# Patient Record
Sex: FEMALE | Race: WHITE | Employment: FULL TIME | URBAN - METROPOLITAN AREA
[De-identification: names, ages, dates, MRNs, and addresses within clinical notes are randomized per-mention and may not be internally consistent; named-entity substitution may affect disease eponyms.]

---

## 2017-04-17 PROBLEM — E11.9 DIABETES MELLITUS TYPE 2, CONTROLLED (HCC): Status: ACTIVE | Noted: 2017-04-17

## 2017-05-26 ENCOUNTER — HOSPITAL ENCOUNTER (OUTPATIENT)
Dept: MAMMOGRAPHY | Age: 57
Discharge: HOME | End: 2017-05-26
Attending: FAMILY MEDICINE
Payer: COMMERCIAL

## 2017-05-26 DIAGNOSIS — Z12.39 SCREENING FOR BREAST CANCER: ICD-10-CM

## 2017-05-26 PROCEDURE — 77067 SCR MAMMO BI INCL CAD: CPT

## 2018-10-03 PROBLEM — E66.01 SEVERE OBESITY (HCC): Status: ACTIVE | Noted: 2018-10-03

## 2018-12-04 ENCOUNTER — HOSPITAL ENCOUNTER (OUTPATIENT)
Dept: MAMMOGRAPHY | Age: 58
Discharge: HOME | End: 2018-12-04
Attending: FAMILY MEDICINE
Payer: COMMERCIAL

## 2018-12-04 DIAGNOSIS — Z12.39 SCREENING BREAST EXAMINATION: ICD-10-CM

## 2018-12-04 PROCEDURE — 77067 SCR MAMMO BI INCL CAD: CPT

## 2019-10-06 ENCOUNTER — HOSPITAL ENCOUNTER (OUTPATIENT)
Dept: SLEEP MEDICINE | Age: 59
Discharge: HOME | End: 2019-10-06
Payer: COMMERCIAL

## 2019-10-06 PROCEDURE — 95811 POLYSOM 6/>YRS CPAP 4/> PARM: CPT

## 2019-10-24 PROBLEM — G47.31 COMPLEX SLEEP APNEA SYNDROME: Status: ACTIVE | Noted: 2019-10-24

## 2019-10-24 PROBLEM — G47.33 OSA (OBSTRUCTIVE SLEEP APNEA): Status: ACTIVE | Noted: 2019-10-24

## 2019-10-24 PROBLEM — R23.2 FLUSHING: Status: ACTIVE | Noted: 2019-10-24

## 2020-02-14 ENCOUNTER — HOSPITAL ENCOUNTER (OUTPATIENT)
Dept: MAMMOGRAPHY | Age: 60
Discharge: HOME | End: 2020-02-14
Attending: FAMILY MEDICINE
Payer: COMMERCIAL

## 2020-02-14 DIAGNOSIS — Z12.39 SCREENING FOR BREAST CANCER: ICD-10-CM

## 2020-02-14 PROCEDURE — 77067 SCR MAMMO BI INCL CAD: CPT

## 2021-03-15 PROBLEM — D12.2 ADENOMATOUS POLYP OF ASCENDING COLON: Status: ACTIVE | Noted: 2021-03-15

## 2021-06-01 ENCOUNTER — HOSPITAL ENCOUNTER (OUTPATIENT)
Dept: MAMMOGRAPHY | Age: 61
Discharge: HOME | End: 2021-06-01
Attending: FAMILY MEDICINE
Payer: COMMERCIAL

## 2021-06-01 ENCOUNTER — TRANSCRIBE ORDER (OUTPATIENT)
Dept: SCHEDULING | Age: 61
End: 2021-06-01

## 2021-06-01 DIAGNOSIS — Z12.31 SCREENING MAMMOGRAM FOR HIGH-RISK PATIENT: Primary | ICD-10-CM

## 2021-06-01 DIAGNOSIS — Z12.31 SCREENING MAMMOGRAM FOR HIGH-RISK PATIENT: ICD-10-CM

## 2021-06-01 PROCEDURE — 77067 SCR MAMMO BI INCL CAD: CPT

## 2022-03-18 PROBLEM — G47.33 OSA (OBSTRUCTIVE SLEEP APNEA): Status: ACTIVE | Noted: 2019-10-24

## 2022-03-19 PROBLEM — R23.2 FLUSHING: Status: ACTIVE | Noted: 2019-10-24

## 2022-03-19 PROBLEM — E66.01 SEVERE OBESITY (HCC): Status: ACTIVE | Noted: 2018-10-03

## 2022-03-19 PROBLEM — E11.9 DIABETES MELLITUS TYPE 2, CONTROLLED (HCC): Status: ACTIVE | Noted: 2017-04-17

## 2022-03-20 PROBLEM — D12.2 ADENOMATOUS POLYP OF ASCENDING COLON: Status: ACTIVE | Noted: 2021-03-15

## 2022-07-18 NOTE — TELEPHONE ENCOUNTER
Patient called and left a message stating that she will run out of some of her medication before her coming appointment.

## 2022-07-19 RX ORDER — EMPAGLIFLOZIN, METFORMIN HYDROCHLORIDE 12.5; 1 MG/1; MG/1
2 TABLET, EXTENDED RELEASE ORAL DAILY
Qty: 180 TABLET | Refills: 1 | Status: SHIPPED | OUTPATIENT
Start: 2022-07-19

## 2022-07-19 RX ORDER — NEBIVOLOL 5 MG/1
5 TABLET ORAL NIGHTLY
Qty: 90 TABLET | Refills: 1 | Status: SHIPPED | OUTPATIENT
Start: 2022-07-19

## 2022-07-19 RX ORDER — SIMVASTATIN 20 MG
20 TABLET ORAL NIGHTLY
Qty: 90 TABLET | Refills: 1 | Status: SHIPPED | OUTPATIENT
Start: 2022-07-19

## 2022-08-04 DIAGNOSIS — I10 ESSENTIAL HYPERTENSION, BENIGN: Primary | ICD-10-CM

## 2022-08-04 DIAGNOSIS — E78.2 MIXED HYPERLIPIDEMIA: ICD-10-CM

## 2022-08-04 DIAGNOSIS — E11.9 CONTROLLED TYPE 2 DIABETES MELLITUS WITHOUT COMPLICATION, WITHOUT LONG-TERM CURRENT USE OF INSULIN (HCC): ICD-10-CM

## 2022-08-09 DIAGNOSIS — I10 ESSENTIAL HYPERTENSION, BENIGN: ICD-10-CM

## 2022-08-09 DIAGNOSIS — E11.9 CONTROLLED TYPE 2 DIABETES MELLITUS WITHOUT COMPLICATION, WITHOUT LONG-TERM CURRENT USE OF INSULIN (HCC): ICD-10-CM

## 2022-08-09 DIAGNOSIS — E78.2 MIXED HYPERLIPIDEMIA: ICD-10-CM

## 2022-08-09 LAB
ALBUMIN SERPL-MCNC: 4.3 G/DL (ref 3.2–4.6)
ALBUMIN/GLOB SERPL: 1.5 {RATIO} (ref 1.2–3.5)
ALP SERPL-CCNC: 86 U/L (ref 50–136)
ALT SERPL-CCNC: 30 U/L (ref 12–65)
ANION GAP SERPL CALC-SCNC: 4 MMOL/L (ref 7–16)
AST SERPL-CCNC: 15 U/L (ref 15–37)
BILIRUB SERPL-MCNC: 0.6 MG/DL (ref 0.2–1.1)
BUN SERPL-MCNC: 22 MG/DL (ref 8–23)
CALCIUM SERPL-MCNC: 9.6 MG/DL (ref 8.3–10.4)
CHLORIDE SERPL-SCNC: 107 MMOL/L (ref 98–107)
CHOLEST SERPL-MCNC: 137 MG/DL
CO2 SERPL-SCNC: 29 MMOL/L (ref 21–32)
CREAT SERPL-MCNC: 1 MG/DL (ref 0.6–1)
GLOBULIN SER CALC-MCNC: 2.9 G/DL (ref 2.3–3.5)
GLUCOSE SERPL-MCNC: 128 MG/DL (ref 65–100)
HDLC SERPL-MCNC: 38 MG/DL (ref 40–60)
HDLC SERPL: 3.6 {RATIO}
LDLC SERPL CALC-MCNC: 68.2 MG/DL
POTASSIUM SERPL-SCNC: 4.6 MMOL/L (ref 3.5–5.1)
PROT SERPL-MCNC: 7.2 G/DL (ref 6.3–8.2)
SODIUM SERPL-SCNC: 140 MMOL/L (ref 136–145)
TRIGL SERPL-MCNC: 154 MG/DL (ref 35–150)
VLDLC SERPL CALC-MCNC: 30.8 MG/DL (ref 6–23)

## 2022-08-10 LAB
EST. AVERAGE GLUCOSE BLD GHB EST-MCNC: 134 MG/DL
HBA1C MFR BLD: 6.3 % (ref 4.8–5.6)

## 2022-08-16 ENCOUNTER — OFFICE VISIT (OUTPATIENT)
Dept: FAMILY MEDICINE CLINIC | Facility: CLINIC | Age: 62
End: 2022-08-16
Payer: COMMERCIAL

## 2022-08-16 VITALS
BODY MASS INDEX: 32.76 KG/M2 | DIASTOLIC BLOOD PRESSURE: 67 MMHG | HEART RATE: 62 BPM | TEMPERATURE: 96.6 F | RESPIRATION RATE: 18 BRPM | SYSTOLIC BLOOD PRESSURE: 120 MMHG | WEIGHT: 178 LBS | HEIGHT: 62 IN

## 2022-08-16 DIAGNOSIS — L81.4 SOLAR LENTIGO: ICD-10-CM

## 2022-08-16 DIAGNOSIS — E11.9 CONTROLLED TYPE 2 DIABETES MELLITUS WITHOUT COMPLICATION, WITHOUT LONG-TERM CURRENT USE OF INSULIN (HCC): ICD-10-CM

## 2022-08-16 DIAGNOSIS — M25.552 BILATERAL HIP PAIN: ICD-10-CM

## 2022-08-16 DIAGNOSIS — D12.2 ADENOMATOUS POLYP OF ASCENDING COLON: ICD-10-CM

## 2022-08-16 DIAGNOSIS — M76.32 IT BAND SYNDROME, LEFT: Primary | ICD-10-CM

## 2022-08-16 DIAGNOSIS — M25.551 BILATERAL HIP PAIN: ICD-10-CM

## 2022-08-16 PROCEDURE — 3044F HG A1C LEVEL LT 7.0%: CPT | Performed by: FAMILY MEDICINE

## 2022-08-16 PROCEDURE — 99214 OFFICE O/P EST MOD 30 MIN: CPT | Performed by: FAMILY MEDICINE

## 2022-08-16 RX ORDER — MAGNESIUM 30 MG
30 TABLET ORAL 2 TIMES DAILY
COMMUNITY

## 2022-08-16 ASSESSMENT — ENCOUNTER SYMPTOMS
DIARRHEA: 0
CONSTIPATION: 0
SHORTNESS OF BREATH: 0
VOMITING: 0
COUGH: 0

## 2022-08-16 NOTE — PROGRESS NOTES
Nicky Richard (:  1960) is a 62 y.o. female,Established patient, here for evaluation of the following chief complaint(s):  3 Month Follow-Up (On HTN and blood work.), Hip Pain (On both side but more when she has been walking or sitting a lot. The pain has been in the back as well. Did have a shooting pain in her left leg at night about 10 days ago.), Skin Problem (On her chest.), and Other (Is over due for her Colonoscopy and is schedule for one later this year. Had never had a Shingels shot, and had not had a Pneumonia shot in the last 10 years. Has been over a year since last Diabetic foot and Eye exam, but does have eye exam schedule later this year.)         ASSESSMENT/PLAN:  1. It band syndrome, left  2. Bilateral hip pain  -     XR HIP BILATERAL W AP PELVIS (2 VIEWS); Future  3. Adenomatous polyp of ascending colon  -     [facility] - Jean Perkins MD, Gastroenterology, [place]  4. Controlled type 2 diabetes mellitus without complication, without long-term current use of insulin (HCC)  -     SITagliptin (JANUVIA) 100 MG tablet; Take 1 tablet by mouth in the morning., Disp-90 tablet, R-1Normal  5. Solar lentigo  Labs done prior to appointment were reviewed in person today. Refilled [facility]. Hip xrays ordered. If no bony abnormality and persistent lateral hip pain, may come back for steroid injections and or we can do PT. She does have some relief with stretching, so she can try this as well as new shoes. Overdue for colo, but is supposed to get one soon. I don't see that order. I went ahead and put in a referral for this as well. Chest lesion suspect solar lentigo w/ some potential AK. Asked her to take a picture next to coin to keep an eye on this and return if any concerns and we will biopsy. Return in about 6 months (around 2023). Subjective   SUBJECTIVE/OBJECTIVE:  HPI  Chief Complaint   Patient presents with    3 Month Follow-Up     On HTN and blood work.     Hip Pain     On both side but more when she has been walking or sitting a lot. The pain has been in the back as well. Did have a shooting pain in her left leg at night about 10 days ago. Skin Problem     On her chest.    Other     Is over due for her Colonoscopy and is schedule for one later this year. Had never had a Shingels shot, and had not had a Pneumonia shot in the last 10 years. Has been over a year since last Diabetic foot and Eye exam, but does have eye exam schedule later this year. F/U HTN - BP well controlled. Compliant w/ med(s). Denies s/s target organ damage. Bystolic  F/U DM - blood sugars well controlled per pt. Compliant w/ meds w/o adverse effects. Denies s/s uncontrolled DM or of target organ damage. Lab Results   Component Value Date    LABA1C 6.3 (H) 08/09/2022     Lab Results   Component Value Date     08/09/2022     Taking synjardy and [facility] and [place] w/ good results. F/U  Dyslipidemia -  Pt states compliant w/ regimen w/o medication adverse effects. Zocor 20mg  F/U allergic rhinitis - well controlled on current regimen. Denies nasal/ocular sx's. Zyrtec and flonase    Hasn't been walking as much due to her hips hurting on both sides. She was walking every day and it was irritating them but now she has decreased to every other day. Does have some pain at rest.      Back seems to be sore in the middle and to the right of the lumbar spine. Now also having pain in the left IT band area. Review of Systems   Constitutional:  Negative for diaphoresis and unexpected weight change. HENT:  Negative for congestion. Eyes:  Negative for visual disturbance. Respiratory:  Negative for cough and shortness of breath. Cardiovascular:  Negative for chest pain. Gastrointestinal:  Negative for constipation, diarrhea and vomiting. Genitourinary:  Negative for dysuria. Neurological:  Negative for headaches. Psychiatric/Behavioral:  Negative for dysphoric mood and sleep disturbance. The patient is not nervous/anxious. Objective   Physical Exam  Vitals reviewed. Constitutional:       Appearance: Normal appearance. She is obese. HENT:      Head: Normocephalic. Eyes:      Extraocular Movements: Extraocular movements intact. Conjunctiva/sclera: Conjunctivae normal.      Pupils: Pupils are equal, round, and reactive to light. Cardiovascular:      Rate and Rhythm: Normal rate and regular rhythm. Heart sounds: Normal heart sounds. No murmur heard. Pulmonary:      Effort: Pulmonary effort is normal. No respiratory distress. Breath sounds: Normal breath sounds. Abdominal:      General: Bowel sounds are normal.      Palpations: Abdomen is soft. Musculoskeletal:         General: Normal range of motion. Right hip: Tenderness present. No crepitus. Normal range of motion. Normal strength. Left hip: Tenderness present. No crepitus. Normal range of motion. Normal strength. Legs:       Comments: Areas tenderness. Skin:     General: Skin is warm and dry. Neurological:      General: No focal deficit present. Mental Status: She is alert. Psychiatric:         Mood and Affect: Mood normal.         Behavior: Behavior normal.   /67 (Site: Left Upper Arm, Position: Sitting, Cuff Size: Large Adult)   Pulse 62   Temp (!) 96.6 °F (35.9 °C)   Resp 18   Ht 5' 2\" (1.575 m)   Wt 178 lb (80.7 kg)   BMI 32.56 kg/m²       An electronic signature was used to authenticate this note.     --Larey Ahumada, MD

## 2022-09-08 NOTE — PROGRESS NOTES
[address] [place] [address] Court, [address] [facility]  [phone]    Patient Name:  Percy Baker  YOB: 1960      Office Visit 9/9/2022    CHIEF COMPLAINT:    Chief Complaint   Patient presents with    Sleep Apnea    Follow-up         HISTORY OF PRESENT ILLNESS:      Patient is a 63 yo female seen today for follow up of obstructive sleep apnea. Patient was diagnosed with moderate sleep apnea in 2019 with AHI 21.8 events per hour. Lowest oxygen was 78%. She was started on APAP 5 to 15 cm and remains on this pressure  today. She is prescribed cpap therapy with a humidifier set at 5-15 cm with a full face mask. Most recent download reveals AHI on PAP therapy is 2.6, leak is 33.7 and the hourly usage is 7 hours 39 minutes nightly. The overall use is 2784 hours with days greater than four hours at 361/365. The patient is compliant with the Pap therapy and is feeling better as a result. She reports she is sleeping well with CPAP therapy and denies any daytime fatigue or needing to nap during the day. [place] score is 0/24. Her leaks are slightly high at 33.7. She states she does notice an increase in leaks when she is on her back but she mainly sleeps on her side so this is not an issue. She reports her weight has been around 180 pounds over the last year with no significant change. She denies any swelling in her ankles or feet. Her blood pressure is controlled today.               Past Medical History:   Diagnosis Date    Allergic conjunctivitis     Allergic rhinitis     Diabetes mellitus type 2, controlled ([address] 4/17/2017    Fibroadenosis of breast     Hypercholesterolemia     Hypertension     Multiple thyroid nodules     Sinusitis     Type 2 diabetes mellitus ([address]          Patient Active Problem List   Diagnosis    TERESA (obstructive sleep apnea)    Fibroadenosis of breast    Mixed hyperlipidemia    Allergic rhinitis    Severe obesity ([address]    Essential hypertension, benign    Diabetes mellitus type 2, controlled ([facility]    Multiple thyroid nodules    Flushing    Adenomatous polyp of ascending colon    Allergic conjunctivitis          Past Surgical History:   Procedure Laterality Date    BREAST BIOPSY  03/18/2011    calcifications removed from right breast    COLONOSCOPY  12/15/2015    Diverticulosis and polyps of colon and internal and external hemorrhoids    HYSTERECTOMY ([facility])  2001    ovaries intact           Social History     Socioeconomic History    Marital status:      Spouse name: Not on file    Number of children: Not on file    Years of education: Not on file    Highest education level: Not on file   Occupational History    Not on file   Tobacco Use    Smoking status: Never    Smokeless tobacco: Never   Substance and Sexual Activity    Alcohol use: No    Drug use: No    Sexual activity: Not on file   Other Topics Concern    Not on file   Social History Narrative    Not on file     Social Determinants of Health     Financial Resource Strain: Not on file   Food Insecurity: Not on file   Transportation Needs: Not on file   Physical Activity: Not on file   Stress: Not on file   Social Connections: Not on file   Intimate Partner Violence: Not on file   Housing Stability: Not on file         Family History   Problem Relation Age of Onset    Rheum Arthritis Mother     Hypertension Mother     Osteoarthritis Mother     Thyroid Disease Mother     Heart Disease Father         CAD    Stroke Father     Hypertension Father     Osteoarthritis Father     Heart Attack Father     Cancer Brother         lymphoma    Osteoarthritis Sister     Hypertension Sister     Breast Cancer Maternal Aunt 76    Diabetes Neg Hx     Thyroid Cancer Neg Hx          No Known Allergies      Current Outpatient Medications   Medication Sig    magnesium 30 MG tablet Take 30 mg by mouth in the morning and 30 mg before bedtime.     SITagliptin (JANUVIA) 100 MG tablet Take 1 tablet by mouth in the morning. nebivolol (BYSTOLIC) 5 MG tablet Take 1 tablet by mouth at bedtime TAKE 1 TABLET BY MOUTH NIGHTLY    simvastatin (ZOCOR) 20 MG tablet Take 1 tablet by mouth nightly    Empagliflozin-metFORMIN HCl ER (SYNJARDY XR) 12.5-1000 MG TB24 Take 2 tablets by mouth daily    albuterol sulfate HFA (PROVENTIL;VENTOLIN;PROAIR) 108 (90 Base) MCG/ACT inhaler Inhale 2 puffs into the lungs every 4 hours as needed    cetirizine (ZYRTEC) 10 MG tablet Take by mouth    fluticasone (FLONASE) 50 MCG/ACT nasal spray 2 sprays in each nare qd    olopatadine (PATADAY) 0.2 % SOLN ophthalmic solution Apply 1 drop to eye daily     No current facility-administered medications for this visit. REVIEW OF SYSTEMS:   CONSTITUTIONAL:   There is no history of fever, chills, night sweats, weight loss, weight gain, persistent fatigue, or lethargy/hypersomnolence. CARDIAC:   No chest pain, pressure, discomfort, palpitations, orthopnea, murmurs, or edema. GI:   No dysphagia, heartburn reflux, nausea/vomiting, diarrhea, abdominal pain, or bleeding. NEURO:   There is no history of AMS, persistent headache, decreased level of consciousness, seizures, or motor or sensory deficits. PHYSICAL EXAM:    Vitals:    09/09/22 0957   BP: 114/68   Pulse: 68   Resp: 15   Temp: 97 °F (36.1 °C)   SpO2: 98%   Weight: 180 lb 9.6 oz (81.9 kg)   Height: 5' 2\" (1.575 m)        BMI: 32.56        GENERAL APPEARANCE:   The patient is normal weight and in no respiratory distress. HEENT:   PERRL. Conjunctivae unremarkable. Nasal mucosa is without epistaxis, exudate, or polyps. Nares patent bilateral.  Gums and dentition are unremarkable. There is oropharyngeal narrowing. Jordan score 2. NECK/LYMPHATIC:   Symmetrical with no elevation of jugular venous pulsation. Trachea midline. No thyroid enlargement. No cervical adenopathy. LUNGS:   Normal respiratory effort with symmetrical lung expansion.    Breath sounds clear.   HEART:   There is a regular rate and rhythm. No murmur, rub, or gallop. There is no edema in the lower extremities. ABDOMEN:   Soft and non-tender. No hepatosplenomegaly. Bowel sounds are normal.     NEURO:   The patient is alert and oriented to person, place, and time. Memory appears intact and mood is normal.  No gross sensorimotor deficits are present. ASSESSMENT:  (Medical Decision Making)      Diagnosis Orders   1. TERESA (obstructive sleep apnea)  DME - DURABLE MEDICAL EQUIPMENT - Patient is using, compliant and benefiting from Pap therapy. Continue current settings as AHI is down to 2.6 events per hour. 2. Obesity (BMI 30.0-34. 9)  Patient can lose weight including ambulating 8-10,000 steps. Can Build Up Slowly as tolerated to this goal.    Also modifying dietary intake with decrease carbohydrates and sweets. Told to use avoid the P's --> Pizza, Pasta, Pastry, etc.  Increase fruits, vegetables, and lean meats e.g. [place] [place]), chicken or game meat. Patient is aware the goal is to consume less than 2000 dylan/day, since patient is a nondiabetic. We discussed using the Sarthak LOSE IT to count calories. Patient can police themselves. If the future, if still having issues can use a more regimented diet e.g. [place], [address] etc. Clinical correlation suggested.             PLAN:    Continue CPAP 5-15 cm H2O with nightly compliance  New supplies ordered  Recommendations as above  Follow-up in 1 year or sooner if needed    Orders Placed This Encounter   Procedures    DME - [address] [facility]  Phone: [phone] [address]  Dept: [phone]      Patient Name: Ilsa Zavaleta  : 1960  Gender: female  Address: [place] [address]  Patient phone: [phone] (home) [phone] (work)      Primary Insurance: Payor: [facility] [place] [facility] / Plan: [facility] [employer] / Product Type: *No Product type* /   Subscriber ID: [id number] - (Commercial)      AMB Supply Order  Order Details     DME Location: [facility]   Order Date: 9/9/2022   The primary encounter diagnosis was TERESA (obstructive sleep apnea). A diagnosis of Obesity (BMI 30.0-34.9) was also pertinent to this visit. (  X   )Supplies Needed       CPAP Machine   (     ) CPAP Unit  (  x   ) Auto CPAP Unit  (     ) BiLevel Unit  (     ) Auto BiLevel Unit  (     ) ASV   (     ) Bilevel ST      Length of need: 12 months    Pressure:  5-15 cmH20  EPR: 3     Starting Ramp Pressure:   cm H20  Ramp Time: min      Patient had a diagnostic Apnea Hypopnea Index (AHI) of :  21.8  *SUPPLIES* Replace all as needed, or per coverage guidelines     Masks Type:  ( x   ) -Full Face Mask (1 per 3 mon)  (  x  ) -Full Mask (1 per month) Interface/Cushion      (  ) -Nasal Mask (1 per 3 mon)  (  ) - Nasal Mask (1 per month) Interface/Cushion  (     ) -Pillow (2 per mon)  (     ) -Nfyvwdsvz (1 per 6 mon)            Other Supplies:    (   X  )-Pnwrqval (1 per 6 mon)  (   X  )-Fkssou Tubing (1 per 3 mon)  (   X  )- Disposable Filter (2 per mon)  (   x  )-Gfpzbl Humidifier (1 per year)     ( x    )-Unjavcyzg (sometimes used with Full Face Mask) (1 per 6 mos)  (    )-Tubing-without heat (1 per 3 mos)  (     )-Non-Disposable Filter (1 per 6 mos)  (  x   )-Water Chamber (1 per 6 mos)  (     )-Humidifier non-heated (1 per 5 yrs)      Signed Date: 9/9/2022  Electronically Signed By: [id number], APRN - CNP  Electronically Dated:  9/9/2022               Collaborating Physician: Dr. Mounika Sy    Over 50% of today's office visit was spent in face to face time reviewing test results, prognosis, importance of compliance, education about disease process, benefits of medications, instructions for management of acute flare-ups, and follow up plans.   Total face to face time spent with patient was 20 minutes.         [address], APRN - CNP  Electronically signed

## 2022-09-09 ENCOUNTER — OFFICE VISIT (OUTPATIENT)
Dept: SLEEP MEDICINE | Age: 62
End: 2022-09-09
Payer: COMMERCIAL

## 2022-09-09 VITALS
BODY MASS INDEX: 33.23 KG/M2 | HEIGHT: 62 IN | SYSTOLIC BLOOD PRESSURE: 114 MMHG | WEIGHT: 180.6 LBS | OXYGEN SATURATION: 98 % | TEMPERATURE: 97 F | RESPIRATION RATE: 15 BRPM | HEART RATE: 68 BPM | DIASTOLIC BLOOD PRESSURE: 68 MMHG

## 2022-09-09 DIAGNOSIS — E66.9 OBESITY (BMI 30.0-34.9): ICD-10-CM

## 2022-09-09 DIAGNOSIS — G47.33 OSA (OBSTRUCTIVE SLEEP APNEA): Primary | ICD-10-CM

## 2022-09-09 PROCEDURE — 99213 OFFICE O/P EST LOW 20 MIN: CPT | Performed by: NURSE PRACTITIONER

## 2022-09-09 ASSESSMENT — SLEEP AND FATIGUE QUESTIONNAIRES
HOW LIKELY ARE YOU TO NOD OFF OR FALL ASLEEP WHILE LYING DOWN TO REST IN THE AFTERNOON WHEN CIRCUMSTANCES PERMIT: 0
HOW LIKELY ARE YOU TO NOD OFF OR FALL ASLEEP WHILE WATCHING TV: 0
ESS TOTAL SCORE: 0
HOW LIKELY ARE YOU TO NOD OFF OR FALL ASLEEP WHILE SITTING AND READING: 0
HOW LIKELY ARE YOU TO NOD OFF OR FALL ASLEEP WHILE SITTING QUIETLY AFTER LUNCH WITHOUT ALCOHOL: 0
HOW LIKELY ARE YOU TO NOD OFF OR FALL ASLEEP IN A CAR, WHILE STOPPED FOR A FEW MINUTES IN TRAFFIC: 0
HOW LIKELY ARE YOU TO NOD OFF OR FALL ASLEEP WHILE SITTING INACTIVE IN A PUBLIC PLACE: 0
HOW LIKELY ARE YOU TO NOD OFF OR FALL ASLEEP WHEN YOU ARE A PASSENGER IN A CAR FOR AN HOUR WITHOUT A BREAK: 0
HOW LIKELY ARE YOU TO NOD OFF OR FALL ASLEEP WHILE SITTING AND TALKING TO SOMEONE: 0

## 2022-09-09 NOTE — PATIENT INSTRUCTIONS
Continue CPAP 5-15 cm H2O with nightly compliance  New supplies ordered  Recommendations as above  Follow-up in 1 year or sooner if needed

## 2022-09-23 ENCOUNTER — HOSPITAL ENCOUNTER (OUTPATIENT)
Dept: GENERAL RADIOLOGY | Age: 62
Discharge: HOME | End: 2022-09-26
Payer: COMMERCIAL

## 2022-09-23 DIAGNOSIS — M25.552 BILATERAL HIP PAIN: ICD-10-CM

## 2022-09-23 DIAGNOSIS — M25.551 BILATERAL HIP PAIN: ICD-10-CM

## 2022-09-23 PROCEDURE — 73521 X-RAY EXAM HIPS BI 2 VIEWS: CPT

## 2022-09-26 ENCOUNTER — TELEPHONE (OUTPATIENT)
Dept: FAMILY MEDICINE CLINIC | Facility: CLINIC | Age: 62
End: 2022-09-26

## 2022-09-26 NOTE — TELEPHONE ENCOUNTER
I called the patient about this, but got her voice mail box. I left a message letting her know what Dr. Lexii Wang stated about the results. I asked her to please call back to let us know what she wants to do.

## 2022-09-26 NOTE — TELEPHONE ENCOUNTER
----- Message from Dasha Dick MD sent at 9/26/2022  7:40 AM EDT -----  [place] showed no significant osteoarthritis. I'm wondering if she would like to pursue pt or even injections in office.

## 2022-10-12 ENCOUNTER — HOSPITAL ENCOUNTER (OUTPATIENT)
Dept: PHYSICAL THERAPY | Age: 62
Setting detail: RECURRING SERIES
Discharge: HOME | End: 2022-10-15
Payer: COMMERCIAL

## 2022-10-12 PROCEDURE — 97161 PT EVAL LOW COMPLEX 20 MIN: CPT

## 2022-10-12 PROCEDURE — 97140 MANUAL THERAPY 1/> REGIONS: CPT

## 2022-10-12 PROCEDURE — 97110 THERAPEUTIC EXERCISES: CPT

## 2022-10-12 ASSESSMENT — PAIN SCALES - GENERAL: PAINLEVEL_OUTOF10: 2

## 2022-10-12 NOTE — THERAPY EVALUATION
Martín Flores  : 1960  Primary: [employer]  Secondary:  [address] @ [facility]  [address]  [facility] [address] [facility] [address]  Phone: [phone]  Fax: [phone]         PT Visit Info:         Visit Count:  1    OUTPATIENT PHYSICAL THERAPY:OP NOTE TYPE: Initial Assessment 10/12/2022               Episode  Appt Desk         Treatment Diagnosis:  Pain in left hip (M25.552)  Stiffness of Left Hip, Not elsewhere classified (M25.652)  Pain in Right Hip (M25.551)  Stiffness of Right Hip, Not elsewhere classified (M25.651)  Medical/Referring Diagnosis:  It band syndrome, left [M76.32]  Bilateral hip pain [M25.551, M25.552]  Referring Physician:  Nika Sharma MD MD Orders:  PT Eval and Treat   Return MD Appt:    Date of Onset:  Onset Date: 22   Allergies:  Patient has no known allergies. Restrictions/Precautions:           Medications Last Reviewed:  10/12/2022     SUBJECTIVE   History of Injury/Illness (Reason for Referral):  Pt states she has been having bilateral hip pain (R>L) since 2022. Pain is primarily located in the posterior-lateral aspect of the hip and is a constant dull/aching pain that doesn't vary much. Will occasionally get a sharp pain in the low back. Groin pain with over activity. Pt also reports some radiating L LE pain. This pain goes down the lateral aspect of the leg to the calf and foot and happens with over activity and only comes on at night (1-2x in last few weeks). Eases: moving, NSAIDs  Aggs: sitting  Patient Stated Goal(s):   \"Decrease hip pain, improve function\"  Initial:     2/10 Post Session:     1/10  Past Medical History/Comorbidities:   Ms. Paula Emerson  has a past medical history of Allergic conjunctivitis, Allergic rhinitis, Diabetes mellitus type 2, controlled ([facility] Fibroadenosis of breast, Hypercholesterolemia, Hypertension, Multiple thyroid nodules, Sinusitis, and Type 2 diabetes mellitus [facility]).  Ms. Priya Winston  has a past surgical history that includes Breast biopsy (03/18/2011); Hysterectomy (2001); and Colonoscopy (12/15/2015). Social History/Living Environment:   Lives With: Spouse  Type of Home: House  Home Layout: Two level     Prior Level of Function/Work/Activity:   Prior level of function: Walking 3mi daily  Prior level of function: Independent           Learning:   Does the patient/guardian have any barriers to learning?: No barriers  Will there be a co-learner?: No  What is the preferred language of the patient/guardian?: English  Is an  required?: No  How does the patient/guardian prefer to learn new concepts?: Listening; Reading; Demonstration; Pictures/Videos     Fall Risk Scale: Ortega Total Score: 0  Ortega Fall Risk: Low (0-24)     Personal Factors:        Sex:  female        Age:  62 y.o. OBJECTIVE   Observation  Posture: normal  Gait: normal      ROM   Right Left   Flexion 100 100   Extension 10 10   ER 40 40   IR 5 5     Strength (all MMT scores are graded on a scale of 0-5)   Right Left   Hip      Flexion 5 5   Abduction 4 4   Extension 4 4   Glute Max 4 4   ER 4+ 4+   IR 4+ 4+   Knee     Extension 5 5   Flexion 5 5       Joint/Soft Tissue Mobility   Description   Joint Mobility Hip: hypomobile, painful with A-P  Lumbar: normal, painful with CPA at L5   Soft Tissue Mobility TTP in lateral hips/glute med       Special Tests   Right Left   FADDIR + +   FE + +   Scour's + +   Log Roll - -     Functional Mobility Screen  30sec Sit to Stand:  TUG:  DL Squat:  SL Squat  R:  L:      ASSESSMENT   Initial Assessment:  Will Coker presents to physical therapy with MD diagnosis of a bilateral hip pain. Pt demonstrated signs and symptoms consistent with this diagnosis. On objective examination, the patient demonstrated deficits in ROM, strength, joint mobility, soft tissue mobility, functional ability, functional mobility, and ambulatory ability.  The patient also had increased pain. The patient is limited in the following activities: walking, standing, sitting, stairs, work, ADLs, functional activities, exercise. The patient has a good  prognosis for recovery based on the examination findings and may be limited by: N/A. Patient requires skilled physical therapy services in order to return to prior level of function. Problem List: (Impacting functional limitations): Therapy Prognosis:         Assessment Complexity:      PLAN   Effective Dates: 10/12/22 TO     Frequency/Duration:     Interventions Planned (Treatment may consist of any combination of the following):          Goals: (Goals have been discussed and agreed upon with patient.)  Short-Term Functional Goals: Time Frame: 4 weeks  Pt will be compliant with progressive HEP  Pt will be able to go up stairs at work with < 3/10 pain  Discharge Goals: Time Frame: 10 weeks  Pt will improve HOOS Jr score by 5pts  Pt will be able to walk 2mi with pain < 3/10         Outcome Measure: Tool Used: Hip dysfunction and Osteoarthritis Outcome Score for Joint Replacement (HOOS, JR)  Score:  Initial: 10/24 Most Recent: XX/24   Interpretation of Score: The HOOS, JR contains 6 items from the original HOOS survey. Items are coded from 0 to 4, none to extreme respectively. Cassie Rigoberto is scored by summing the raw response (range 0-24) and then converting it to an interval score using the table provided below. The interval score ranges from 0 to 100 where 0 represents total hip disability and 100 represents perfect hip health. Medical Necessity:   > Patient is expected to demonstrate progress in strength, range of motion, coordination, and functional technique to increase independence with ADLs and functional tasks. Reason For Services/Other Comments:  > Patient continues to require skilled intervention due to return to prior level of function.   Total Duration:       Regarding Barak Crook's therapy, I certify that the treatment plan above will be carried out by a therapist or under their direction. Thank you for this referral,  Carmelo Rankin, PT     Referring Physician Signature: Power Lopez* No Signature is Required for this note.         Post Session Pain  Charge Capture  PT Visit Info MD Guidelines  MyChart

## 2022-10-12 NOTE — PROGRESS NOTES
Erroll Level  : 1960  Primary: [employer]  Secondary:  [address] @ [facility]  [address] [facility] [place] [address]  Phone: [phone]  Fax: [phone] Plan Frequency: 2x/week for 90 days    Plan of Care/Certification Expiration Date: 01/10/23      PT Visit Info:  No data recorded   Visit Count:  1   OUTPATIENT PHYSICAL THERAPY:OP NOTE TYPE: Treatment Note 10/12/2022       Episode  }Appt Desk             Treatment Diagnosis:  Pain in left hip (M25.552)  Stiffness of Left Hip, Not elsewhere classified (M25.652)  Pain in Right Hip (M25.551)  Stiffness of Right Hip, Not elsewhere classified (M25.651)  Medical/Referring Diagnosis:  It band syndrome, left [M76.32]  Bilateral hip pain [M25.551, M25.552]  Referring Physician:  Sixto Fisher MD MD Orders:  PT Eval and Treat   Date of Onset:  Onset Date: 22     Allergies:   Patient has no known allergies. Restrictions/Precautions:  No data recorded  No data recorded   Interventions Planned (Treatment may consist of any combination of the following):    Current Treatment Recommendations: Strengthening; ROM; Functional mobility training; Transfer training; ADL/Self-care training; Stair training; Neuromuscular re-education; Manual Therapy - Soft Tissue Mobilization; Manual Therapy - Joint Manipulation; Pain management; Home exercise program; Safety education & training; Patient/Caregiver education & training; Modalities; Integrated dry needling; Therapeutic activities     Subjective Comments:  See Evaluation Note form today for details  Initial:}    2/10Post Session:       1/10  Medications Last Reviewed:  10/12/2022  Updated Objective Findings:  See evaluation note from today  Treatment   THERAPEUTIC EXERCISE: (15 minutes):    Exercises per grid below to improve mobility, strength, and coordination.   Required minimal visual, verbal, manual, and tactile cues to promote proper body posture and promote proper body mechanics. Progressed resistance, range, repetitions, and complexity of movement as indicated. Date:  10/12 Date:   Date:     Activity/Exercise Parameters Parameters Parameters   Education Plan of car, prognosis, anatomy, HEP     NuStep      Bridges 3x10     Lumbar Rotation X20 B     Clams 3x10 B green TB                     MANUAL THERAPY: (15 minutes):   Joint mobilization, Soft tissue mobilization, and Manipulation was utilized and necessary because of the patient's restricted joint motion, painful spasm, loss of articular motion, and restricted motion of soft tissue. Date:  10/12 Date:   Date:     Activity/Exercise Parameters Parameters Parameters   Soft Tissue      Joint Mobilization B Hips: LAD, SAD     Dry Needling            Treatment/Session Summary:    Treatment Assessment:  See Evaluation Note from today for details  Communication/Consultation:  None today  Equipment provided today:  None  Recommendations/Intent for next treatment session: Next visit will focus on progression as tolerated.     Total Treatment Billable Duration:  45 minutes  Time In: 0800  Time Out: [address], PT       Charge Capture  }Post Session Pain  PT Visit Info  [address] [facility] Portal  MD Guidelines  Scanned Media  Benefits  MyChart    Future Appointments   Date Time Provider Pradeep Wilder   10/19/2022  8:00 AM Martin Xavier, PT [facility]   10/25/2022  8:45 AM Martin Xavier, PT [facility]   10/27/2022  8:00 AM Martin Xavier, PT [facility]   2/9/2023  9:00 AM PRE LAB PRE [facility]VL AMB   2/16/2023  8:40 AM Sue Sim MD PRE GVL AMB   9/11/2023 11:20 AM ROMAN Chakraborty - BENNIE PSCD GVL AMB

## 2022-10-19 ENCOUNTER — HOSPITAL ENCOUNTER (OUTPATIENT)
Dept: PHYSICAL THERAPY | Age: 62
Setting detail: RECURRING SERIES
Discharge: HOME | End: 2022-10-22
Payer: COMMERCIAL

## 2022-10-19 PROCEDURE — 97140 MANUAL THERAPY 1/> REGIONS: CPT

## 2022-10-19 PROCEDURE — 97110 THERAPEUTIC EXERCISES: CPT

## 2022-10-19 ASSESSMENT — PAIN SCALES - GENERAL: PAINLEVEL_OUTOF10: 2

## 2022-10-19 NOTE — PROGRESS NOTES
Georges Tobias  : 1960  Primary: [employer]  Secondary:  [address] @ [facility]  [address] [facility] [address] [place] [address]  Phone: [phone]  Fax: [phone] Plan Frequency: 2x/week for 90 days    Plan of Care/Certification Expiration Date: 01/10/23      PT Visit Info:  No data recorded   Visit Count:  2   OUTPATIENT PHYSICAL THERAPY:OP NOTE TYPE: Treatment Note 10/19/2022       Episode  }Appt Desk             Treatment Diagnosis:  Pain in left hip (M25.552)  Stiffness of Left Hip, Not elsewhere classified (M25.652)  Pain in Right Hip (M25.551)  Stiffness of Right Hip, Not elsewhere classified (M25.651)  Medical/Referring Diagnosis:  It band syndrome, left [M76.32]  Bilateral hip pain [M25.551, M25.552]  Referring Physician:  Romana Johnson MD MD Orders:  PT Eval and Treat   Date of Onset:  Onset Date: 22     Allergies:   Patient has no known allergies. Restrictions/Precautions:  No data recorded  No data recorded   Interventions Planned (Treatment may consist of any combination of the following):    Current Treatment Recommendations: Strengthening; ROM; Functional mobility training; Transfer training; ADL/Self-care training; Stair training; Neuromuscular re-education; Manual Therapy - Soft Tissue Mobilization; Manual Therapy - Joint Manipulation; Pain management; Home exercise program; Safety education & training; Patient/Caregiver education & training; Modalities; Integrated dry needling; Therapeutic activities     Subjective Comments:  Pt states she is feeling ok, a little sore after last session but doing ok  Initial:}    2/10Post Session:       2/10  Medications Last Reviewed:  10/19/2022  Updated Objective Findings:  See evaluation note from today  Treatment   THERAPEUTIC EXERCISE: (30 minutes):    Exercises per grid below to improve mobility, strength, and coordination.   Required minimal visual, verbal, manual, and tactile cues to promote proper body posture and promote proper body mechanics. Progressed resistance, range, repetitions, and complexity of movement as indicated. Date:  10/12 Date:  10/19 Date:     Activity/Exercise Parameters Parameters Parameters   Education Plan of car, prognosis, anatomy, HEP     NuStep  x8min    Bridges 3x10 3x10    Lumbar Rotation X20 B     Clams 3x10 B green TB 3x10 B green TB    Sidestepping  3x1min green TB    Hip Flexion  3x10 B 25lbs                                MANUAL THERAPY: (15 minutes):   Joint mobilization, Soft tissue mobilization, and Manipulation was utilized and necessary because of the patient's restricted joint motion, painful spasm, loss of articular motion, and restricted motion of soft tissue. Date:  10/12 Date:  10/19 Date:     Activity/Exercise Parameters Parameters Parameters   Soft Tissue  R glute med    Joint Mobilization B Hips: LAD, SAD L Hip: A-P grades II-IV    Dry Needling            Treatment/Session Summary:    Treatment Assessment:  Pt tolerated session well, no pain with exercise but some fatigue  Communication/Consultation:  None today  Equipment provided today:  None  Recommendations/Intent for next treatment session: Next visit will focus on progression as tolerated.     Total Treatment Billable Duration:  45 minutes  Time In: 0800  Time Out: [address], PT       Charge Capture  }Post Session Pain  PT Visit Info  [facility] Portal  MD Guidelines  Scanned Media  Benefits  MyChart    Future Appointments   Date Time Provider Pradeep Wilder   10/25/2022  8:45 AM Carmelo Rankin PT [facility]   10/27/2022  8:00 AM GILLIAN HarrisOST SFO   11/1/2022  4:00 PM GILLIAN HarrisOST SFO   11/3/2022  4:00 PM Carmelo Rankin PT [facility]   2/9/2023  9:00 AM PRE LAB PRE GVL AMB   2/16/2023  8:40 AM Emerita Ortiz MD PRE GVL AMB   9/11/2023 11:20 AM ROMAN Howard - BENNIE PSCD GVL AMB

## 2022-10-25 ENCOUNTER — HOSPITAL ENCOUNTER (OUTPATIENT)
Dept: PHYSICAL THERAPY | Age: 62
Setting detail: RECURRING SERIES
Discharge: HOME | End: 2022-10-28
Payer: COMMERCIAL

## 2022-10-25 PROCEDURE — 97110 THERAPEUTIC EXERCISES: CPT

## 2022-10-25 PROCEDURE — 97140 MANUAL THERAPY 1/> REGIONS: CPT

## 2022-10-25 ASSESSMENT — PAIN SCALES - GENERAL: PAINLEVEL_OUTOF10: 1

## 2022-10-25 NOTE — PROGRESS NOTES
Chevy Wan  : 1960  Primary: [employer]  Secondary:  [address] @ [facility]  [address] [facility] [place] [address]  Phone: [phone]  Fax: [phone] Plan Frequency: 2x/week for 90 days    Plan of Care/Certification Expiration Date: 01/10/23      PT Visit Info:  No data recorded   Visit Count:  3   OUTPATIENT PHYSICAL THERAPY:OP NOTE TYPE: Treatment Note 10/25/2022       Episode  }Appt Desk             Treatment Diagnosis:  Pain in left hip (M25.552)  Stiffness of Left Hip, Not elsewhere classified (M25.652)  Pain in Right Hip (M25.551)  Stiffness of Right Hip, Not elsewhere classified (M25.651)  Medical/Referring Diagnosis:  It band syndrome, left [M76.32]  Bilateral hip pain [M25.551, M25.552]  Referring Physician:  Zina Robbins MD MD Orders:  PT Eval and Treat   Date of Onset:  Onset Date: 22     Allergies:   Patient has no known allergies. Restrictions/Precautions:  No data recorded  No data recorded   Interventions Planned (Treatment may consist of any combination of the following):    Current Treatment Recommendations: Strengthening; ROM; Functional mobility training; Transfer training; ADL/Self-care training; Stair training; Neuromuscular re-education; Manual Therapy - Soft Tissue Mobilization; Manual Therapy - Joint Manipulation; Pain management; Home exercise program; Safety education & training; Patient/Caregiver education & training; Modalities; Integrated dry needling; Therapeutic activities     Subjective Comments:  Pt states she is feeling better today  Initial:}    1/10Post Session:       1/10  Medications Last Reviewed:  10/25/2022  Updated Objective Findings:  See evaluation note from today  Treatment   THERAPEUTIC EXERCISE: (30 minutes):    Exercises per grid below to improve mobility, strength, and coordination.   Required minimal visual, verbal, manual, and tactile cues to promote proper body posture and promote proper body mechanics. Progressed resistance, range, repetitions, and complexity of movement as indicated. Date:  10/12 Date:  10/19 Date:  10/25   Activity/Exercise Parameters Parameters Parameters   Education Plan of car, prognosis, anatomy, HEP     NuStep  x8min x8min   Bridges 3x10 3x10 3x10   Lumbar Rotation X20 B     Clams 3x10 B green TB 3x10 B green TB 3x10 B blue TB   Sidestepping  3x1min green TB 3x1min green TB   Hip Flexion  3x10 B 25lbs 3x10 B 25lbs                               MANUAL THERAPY: (15 minutes):   Joint mobilization, Soft tissue mobilization, and Manipulation was utilized and necessary because of the patient's restricted joint motion, painful spasm, loss of articular motion, and restricted motion of soft tissue. Date:  10/12 Date:  10/19 Date:  10/25   Activity/Exercise Parameters Parameters Parameters   Soft Tissue  R glute med R glute med, piriformis   Joint Mobilization B Hips: LAD, SAD L Hip: A-P grades II-IV    Dry Needling            Treatment/Session Summary:    Treatment Assessment:  Pt tolerated session well, decreased hip soreness following MT  Communication/Consultation:  None today  Equipment provided today:  None  Recommendations/Intent for next treatment session: Next visit will focus on progression as tolerated.     Total Treatment Billable Duration:  45 minutes  Time In: 0084  Time Out: [address] [facility], PT       Charge Capture  }Post Session Pain  PT [facility] Info  [facility] Portal  MD Guidelines  Scanned Media  Benefits  MyChart    Future Appointments   Date Time Provider Pradeep Wilder   10/27/2022  8:00 AM Kadeem Crafts, PT [facility]   11/1/2022  4:00 PM Kadeem Crafts, PT [facility]   11/3/2022  4:00 PM Kadeem Crafts, PT [facility]   12/20/2022 11:45 AM SFE MOBILE MAMMO SFERMM SFE   2/9/2023  9:00 AM PRE LAB PRE GVL AMB   2/16/2023  8:40 AM Maureen Herr MD PRE GVL AMB   9/11/2023 11:20 AM Roxane Healy APRN - CNP PSCD GVL AMB

## 2022-10-27 ENCOUNTER — HOSPITAL ENCOUNTER (OUTPATIENT)
Dept: PHYSICAL THERAPY | Age: 62
Setting detail: RECURRING SERIES
Discharge: HOME | End: 2022-10-30
Payer: COMMERCIAL

## 2022-10-27 PROCEDURE — 97110 THERAPEUTIC EXERCISES: CPT

## 2022-10-27 ASSESSMENT — PAIN SCALES - GENERAL: PAINLEVEL_OUTOF10: 1

## 2022-10-27 NOTE — PROGRESS NOTES
Martín Flores  : 1960  Primary: [employer]  Secondary:  [address] @ [facility]  [address] [facility] Martín Coe [place] [address]  Phone: [phone]  Fax: [phone] Plan Frequency: 2x/week for 90 days    Plan of Care/Certification Expiration Date: 01/10/23      PT Visit Info:  No data recorded   Visit Count:  4   OUTPATIENT PHYSICAL THERAPY:OP NOTE TYPE: Treatment Note 10/27/2022       Episode  }Appt Desk             Treatment Diagnosis:  Pain in left hip (M25.552)  Stiffness of Left Hip, Not elsewhere classified (M25.652)  Pain in Right Hip (M25.551)  Stiffness of Right Hip, Not elsewhere classified (M25.651)  Medical/Referring Diagnosis:  It band syndrome, left [M76.32]  Bilateral hip pain [M25.551, M25.552]  Referring Physician:  Nika Sharma MD MD Orders:  PT Eval and Treat   Date of Onset:  Onset Date: 22     Allergies:   Patient has no known allergies. Restrictions/Precautions:  No data recorded  No data recorded   Interventions Planned (Treatment may consist of any combination of the following):    Current Treatment Recommendations: Strengthening; ROM; Functional mobility training; Transfer training; ADL/Self-care training; Stair training; Neuromuscular re-education; Manual Therapy - Soft Tissue Mobilization; Manual Therapy - Joint Manipulation; Pain management; Home exercise program; Safety education & training; Patient/Caregiver education & training; Modalities; Integrated dry needling; Therapeutic activities     Subjective Comments:  Pt states she is feeling better and has no complaints of hip pain. Initial:}    1/10Post Session:       010  Medications Last Reviewed:  10/27/2022  Updated Objective Findings:  See evaluation note from today  Treatment   THERAPEUTIC EXERCISE: (45 minutes):    Exercises per grid below to improve mobility, strength, and coordination.   Required minimal visual, verbal, manual, and tactile cues to promote proper body posture and promote proper body mechanics. Progressed resistance, range, repetitions, and complexity of movement as indicated. Date:  10/19 Date:  10/25 Date:  10/27   Activity/Exercise Parameters Parameters    Education      NuStep x8min x8min x8min   Bridges 3x10 3x10 3x10 w/ blue TB holding iso ABD   Lumbar Rotation      Clams 3x10 B green TB 3x10 B blue TB    Sidestepping 3x1min green TB 3x1min green TB    Hip Flexion 3x10 B 25lbs 3x10 B 25lbs    Supine Marches   3x10   Hurdles at table   [id number]   Balance   x8min   Sit to Stand    2x15 10lbs       MANUAL THERAPY: (0 minutes):   Joint mobilization, Soft tissue mobilization, and Manipulation was utilized and necessary because of the patient's restricted joint motion, painful spasm, loss of articular motion, and restricted motion of soft tissue. Date:  10/12 Date:  10/19 Date:  10/25   Activity/Exercise Parameters Parameters Parameters   Soft Tissue  R glute med R glute med, piriformis   Joint Mobilization B Hips: LAD, SAD L Hip: A-P grades II-IV    Dry Needling            Treatment/Session Summary:    Treatment Assessment:     Communication/Consultation:  None today  Equipment provided today:  None  Recommendations/Intent for next treatment session: Next visit will focus on progression as tolerated.     Total Treatment Billable Duration:  45 minutes  Time In: 0800  Time Out: 0845    Zion Murray       Charge Capture  }Post Session Pain  PT Visit Info  [address] [facility] Portal  MD Guidelines  Scanned Media  Benefits  MyChart    Future Appointments   Date Time Provider [place]   11/1/2022  4:00 PM Alcon Sensor, PT [facility]   11/3/2022  4:00 PM Alcon Sensor, PT [facility]   11/8/2022  8:45 AM Alcon Sensor, PT [facility]   11/10/2022  8:00 AM Alcon Sensor, PT [facility]   12/20/2022 11:45 AM SFE MOBILE MAMMO SFERMM SFE   2/9/2023  9:00 AM PRE LAB PRE GVL AMB   2/16/2023  8:40 AM Gennaro Márquez MD PRE GVL AMB 9/11/2023 11:20 AM Aneesh Healy, ROMAN - BENNIE PSCD GVL AMB

## 2022-11-01 ENCOUNTER — HOSPITAL ENCOUNTER (OUTPATIENT)
Dept: PHYSICAL THERAPY | Age: 62
Setting detail: RECURRING SERIES
Discharge: HOME | End: 2022-11-04
Payer: COMMERCIAL

## 2022-11-01 PROCEDURE — 97110 THERAPEUTIC EXERCISES: CPT

## 2022-11-01 ASSESSMENT — PAIN SCALES - GENERAL: PAINLEVEL_OUTOF10: 1

## 2022-11-01 NOTE — PROGRESS NOTES
Bradley Gaona  : 1960  Primary: [employer]  Secondary:  [address] @ [facility]  [address] [facility] [place] [address]  Phone: [phone]  Fax: [phone] Plan Frequency: 2x/week for 90 days    Plan of Care/Certification Expiration Date: 01/10/23      PT Visit Info:  No data recorded   Visit Count:  5   OUTPATIENT PHYSICAL THERAPY:OP NOTE TYPE: Treatment Note 2022       Episode  }Appt Desk             Treatment Diagnosis:  Pain in left hip (M25.552)  Stiffness of Left Hip, Not elsewhere classified (M25.652)  Pain in Right Hip (M25.551)  Stiffness of Right Hip, Not elsewhere classified (M25.651)  Medical/Referring Diagnosis:  It band syndrome, left [M76.32]  Bilateral hip pain [M25.551, M25.552]  Referring Physician:  Jennifer Mercado MD MD Orders:  PT Eval and Treat   Date of Onset:  Onset Date: 22     Allergies:   Patient has no known allergies. Restrictions/Precautions:  No data recorded  No data recorded   Interventions Planned (Treatment may consist of any combination of the following):    Current Treatment Recommendations: Strengthening; ROM; Functional mobility training; Transfer training; ADL/Self-care training; Stair training; Neuromuscular re-education; Manual Therapy - Soft Tissue Mobilization; Manual Therapy - Joint Manipulation; Pain management; Home exercise program; Safety education & training; Patient/Caregiver education & training; Modalities; Integrated dry needling; Therapeutic activities     Subjective Comments:  Pt states she is doing well and has no pain. Initial:}    10/10Post Session:       00/10  Medications Last Reviewed:  2022  Updated Objective Findings:  See evaluation note from today  Treatment   THERAPEUTIC EXERCISE: (45 minutes):    Exercises per grid below to improve mobility, strength, and coordination.   Required minimal visual, verbal, manual, and tactile cues to promote proper body posture and promote proper body mechanics. Progressed resistance, range, repetitions, and complexity of movement as indicated. Date:  10/25 Date:  10/27 Date  11/1   Activity/Exercise Parameters     Education      NuStep x8min x8min x5min   Bridges 3x10 3x10 w/ blue TB holding iso ABD 2x12 Blue TB  holding iso ABD   Lumbar Rotation      Clams 3x10 B blue TB     Sidestepping 3x1min green TB  3x35ft blue TB   Hip Flexion 3x10 B 25lbs     Supine Marches  3x10    Hurdles at table  [id number]    Balance  x8min x3min   Sit to Stand   2x15 10lbs 3x10 10lbs    Step ups   3x10 B8\" box   Hip Abd   2x10 B 17.5lbs   Monster Walks   3x35ft blue TB   Tandem Walking   3x35ft       MANUAL THERAPY: (0 minutes):   Joint mobilization, Soft tissue mobilization, and Manipulation was utilized and necessary because of the patient's restricted joint motion, painful spasm, loss of articular motion, and restricted motion of soft tissue. Date:  10/12 Date:  10/19 Date:  10/25   Activity/Exercise Parameters Parameters Parameters   Soft Tissue  R glute med R glute med, piriformis   Joint Mobilization B Hips: LAD, SAD L Hip: A-P grades II-IV    Dry Needling            Treatment/Session Summary:    Treatment Assessment:  Pt tolerated tx well and is continuing to increase strength and show less pain. Communication/Consultation:  None today  Equipment provided today:  None  Recommendations/Intent for next treatment session: Next visit will focus on progression as tolerated.     Total Treatment Billable Duration:  45 minutes       [employer] Capture  }Post Session Pain  PT Visit [id number] [facility] Portal  MD Guidelines  Scanned Media  Benefits  MyChart    Future Appointments   Date Time Provider Pradeep Wilder   11/3/2022  4:00 PM Tin Vázquez PT [facility] S[facility]   11/8/2022  8:45 AM Tin Vázquez PT [facility]   11/10/2022  8:00 AM GILLIAN Garcia SFO   12/20/2022 11:45 AM SFE MOBILE MAMMO SFERMM SFE   2/9/2023  9:00 AM PRE LAB PRE GVL AMB   2/16/2023  8:40 AM Arleen Iqbal MD PRE GVL AMB   9/11/2023 11:20 AM ROMAN Sung - BENNIE PSCD GVL AMB

## 2022-11-03 ENCOUNTER — HOSPITAL ENCOUNTER (OUTPATIENT)
Dept: PHYSICAL THERAPY | Age: 62
Setting detail: RECURRING SERIES
Discharge: HOME | End: 2022-11-06
Payer: COMMERCIAL

## 2022-11-03 PROCEDURE — 97110 THERAPEUTIC EXERCISES: CPT

## 2022-11-03 ASSESSMENT — PAIN SCALES - GENERAL: PAINLEVEL_OUTOF10: 0

## 2022-11-03 NOTE — PROGRESS NOTES
Delbert Meza  : 1960  Primary: [employer]  Secondary:  Carito Macdonald @ [facility]  [facility] [address] [place] [address]  Phone: [phone]  Fax: [phone] Plan Frequency: 2x/week for 90 days    Plan of Care/Certification Expiration Date: 01/10/23      PT Visit Info:  No data recorded   Visit Count:  6   OUTPATIENT PHYSICAL THERAPY:OP NOTE TYPE: Treatment Note 11/3/2022       Episode  }Appt Desk             Treatment Diagnosis:  Pain in left hip (M25.552)  Stiffness of Left Hip, Not elsewhere classified (M25.652)  Pain in Right Hip (M25.551)  Stiffness of Right Hip, Not elsewhere classified (M25.651)  Medical/Referring Diagnosis:  It band syndrome, left [M76.32]  Bilateral hip pain [M25.551, M25.552]  Referring Physician:  Jhonatan Sykes MD MD Orders:  PT Eval and Treat   Date of Onset:  Onset Date: 22     Allergies:   Patient has no known allergies. Restrictions/Precautions:  No data recorded  No data recorded   Interventions Planned (Treatment may consist of any combination of the following):    Current Treatment Recommendations: Strengthening; ROM; Functional mobility training; Transfer training; ADL/Self-care training; Stair training; Neuromuscular re-education; Manual Therapy - Soft Tissue Mobilization; Manual Therapy - Joint Manipulation; Pain management; Home exercise program; Safety education & training; Patient/Caregiver education & training; Modalities; Integrated dry needling; Therapeutic activities     Subjective Comments:  Pt states she is feeling well and has no complaints of pain or discomfort. Initial:}    00/10Post Session:       00/10  Medications Last Reviewed:  11/3/2022  Updated Objective Findings:  See evaluation note from today  Treatment   THERAPEUTIC EXERCISE: (45 minutes):    Exercises per grid below to improve mobility, strength, and coordination.   Required minimal visual, verbal, manual, and tactile cues to promote proper body posture and promote proper body mechanics. Progressed resistance, range, repetitions, and complexity of movement as indicated. Date:  10/27 Date  11/1 Date:  11/3   Activity/Exercise      Education      NuStep x8min x5min    Bridges 3x10 w/ blue TB holding iso ABD 2x12 Blue TB  holding iso ABD    Lumbar Rotation      Clams      Sidestepping  3x35ft blue TB 3x5 25lbs    Hip Flexion      Supine Marches 3x10     Hurdles at table [id number]     Balance x8min x3min x3min   Sit to Stand  2x15 10lbs 3x10 10lbs  3x10 10lbs    Step ups  3x10 B8\" box 3x10 B8\" box 10lbs   Hip Abd  2x10 B 17.5lbs 3x10 B 27.5lbs   Monster Walks  3x35ft blue TB    Tandem Walking  3x35ft 3x35ft   Gunlock   x10min   Side Step Ups   3x10 B   Pallof Press   2x10 blue TB   Side Lunges   3x35ft w/ 10lbs       MANUAL THERAPY: (0 minutes):   Joint mobilization, Soft tissue mobilization, and Manipulation was utilized and necessary because of the patient's restricted joint motion, painful spasm, loss of articular motion, and restricted motion of soft tissue. Date:  10/12 Date:  10/19 Date:  10/25   Activity/Exercise Parameters Parameters Parameters   Soft Tissue  R glute med R glute med, piriformis   Joint Mobilization B Hips: LAD, SAD L Hip: A-P grades II-IV    Dry Needling            Treatment/Session Summary:    Treatment Assessment:  Pt tolerated tx well. She continues to improve in strength and endurance. Communication/Consultation:  None today  Equipment provided today:  None  Recommendations/Intent for next treatment session: Next visit will focus on progression as tolerated.     Total Treatment Billable Duration:  45 minutes       [employer] Capture  }Post Session Pain  PT Visit [id number] Portal  MD Guidelines  Scanned Media  Benefits  MyChart    Future Appointments   Date Time Provider [facility]   11/8/2022  8:45 AM Carmelo Rankin PT [facility]   11/10/2022  8:00 AM Carmelo Rankin PT [facility]   12/20/2022 11:45 AM SFE MOBILE MAMMO SFERMM SFE   2/9/2023  9:00 AM PRE LAB PRE GVL AMB   2/16/2023  8:40 AM Daiana Rodriguez MD PRE GVL AMB   9/11/2023 11:20 AM Ligia Healy APRN - CNP PSCD [facility] AMB

## 2022-11-08 ENCOUNTER — HOSPITAL ENCOUNTER (OUTPATIENT)
Dept: PHYSICAL THERAPY | Age: 62
Setting detail: RECURRING SERIES
Discharge: HOME | End: 2022-11-11
Payer: COMMERCIAL

## 2022-11-08 PROCEDURE — 97110 THERAPEUTIC EXERCISES: CPT

## 2022-11-08 NOTE — PROGRESS NOTES
Javier Lynne  : 1960  Primary: [employer]  Secondary:  [address] @ [facility]  [address] [facility] [place]a [place] [address]  Phone: [phone]  Fax: [phone] Plan Frequency: 2x/week for 90 days    Plan of Care/Certification Expiration Date: 01/10/23      PT Visit Info:  No data recorded   Visit Count:  7   OUTPATIENT PHYSICAL THERAPY:OP NOTE TYPE: Treatment Note 2022       Episode  }Appt Desk             Treatment Diagnosis:  Pain in left hip (M25.552)  Stiffness of Left Hip, Not elsewhere classified (M25.652)  Pain in Right Hip (M25.551)  Stiffness of Right Hip, Not elsewhere classified (M25.651)  Medical/Referring Diagnosis:  It band syndrome, left [M76.32]  Bilateral hip pain [M25.551, M25.552]  Referring Physician:  Brendon Humphreys MD MD Orders:  PT Eval and Treat   Date of Onset:  Onset Date: 22     Allergies:   Patient has no known allergies. Restrictions/Precautions:  No data recorded  No data recorded   Interventions Planned (Treatment may consist of any combination of the following):    Current Treatment Recommendations: Strengthening; ROM; Functional mobility training; Transfer training; ADL/Self-care training; Stair training; Neuromuscular re-education; Manual Therapy - Soft Tissue Mobilization; Manual Therapy - Joint Manipulation; Pain management; Home exercise program; Safety education & training; Patient/Caregiver education & training; Modalities; Integrated dry needling; Therapeutic activities     Subjective Comments:  Pt states she is doing well and has had no complaints of pain only post tx soreness that was resolved . Initial:}     0/10Post Session:        0/10  Medications Last Reviewed:  2022  Updated Objective Findings:  See evaluation note from today  Treatment   THERAPEUTIC EXERCISE: (45 minutes):    Exercises per grid below to improve mobility, strength, and coordination.   Required minimal visual, verbal, manual, and tactile cues to promote proper body posture and promote proper body mechanics. Progressed resistance, range, repetitions, and complexity of movement as indicated. Date  11/1 Date:  11/3 Date:  11/8   Activity/Exercise      Education      NuStep x5min  x10min   Bridges 2x12 Blue TB  holding iso ABD  2x12 Blue TB  holding iso ABD  1x10 SL   Lumbar Rotation      Clams      Sidestepping 3x35ft blue TB 3x5 25lbs  3x35ft green TB   Hip Flexion      Supine Marches      Hurdles at table   To the side   2x10 B   Balance x3min x3min x3min   Sit to Stand  3x10 10lbs  3x10 10lbs     Step ups 3x10 B8\" box 3x10 B8\" box 10lbs    Hip Abd 2x10 B 17.5lbs 3x10 B 27.5lbs 2x15 17.5 lbs   Monster Walks 3x35ft blue TB  3x35ft green tb    Tandem Walking 3x35ft 3x35ft    [place]  x10min    Side Step Ups  3x10 B    Pallof Press  2x10 blue TB    Side Lunges  3x35ft w/ 10lbs    Calf Stretch   [id number]   Hip Stretch   x3min   Hamstring Stretch   x3min   Shuttle   DL 2x10 125lbs  Sl 2x10 50lbs       MANUAL THERAPY: (0 minutes):   Joint mobilization, Soft tissue mobilization, and Manipulation was utilized and necessary because of the patient's restricted joint motion, painful spasm, loss of articular motion, and restricted motion of soft tissue. Date:  10/12 Date:  10/19 Date:  10/25   Activity/Exercise Parameters Parameters Parameters   Soft Tissue  R glute med R glute med, piriformis   Joint Mobilization B Hips: LAD, SAD L Hip: A-P grades II-IV    Dry Needling            Treatment/Session Summary:    Treatment Assessment:  Pt tolerated tx well. She was somewhat sore after but had no pain in hip. Communication/Consultation:  None today  Equipment provided today:  None  Recommendations/Intent for next treatment session: Next visit will focus on progression as tolerated.     Total Treatment Billable Duration:  45 minutes       [employer] Capture  }Post Session Pain  PT Visit Maricarmen Gonzalez MD Guidelines  Scanned Media  Benefits  MyChart    Future Appointments   Date Time Provider Pradeep Tina   11/10/2022  8:00 AM Stephenie Hill, PT [facility]   11/16/2022  4:00 PM Stephenie Hill, PT [facility]   11/21/2022  4:00 PM Stephenie Hill, PT [facility]   12/20/2022 11:45 AM SFE [facility]BILE [facility]FE   2/9/2023  9:00 AM PRE LAB PRE [facility] AMB   2/16/2023  8:40 AM Johnie Neves MD PRE [facility] AMB   9/11/2023 11:20 AM Miller Apley Merck, APRN - CNP [facility]CD [facility] AMB

## 2022-11-10 ENCOUNTER — HOSPITAL ENCOUNTER (OUTPATIENT)
Dept: PHYSICAL THERAPY | Age: 62
Setting detail: RECURRING SERIES
End: 2022-11-10
Payer: COMMERCIAL

## 2022-11-10 NOTE — PROGRESS NOTES
Rise Colon  : 1960  Primary: [employer]  Secondary:  [address] @ [address] [place] [place] [address]  Phone: [phone]  Fax: [phone] Plan Frequency: 2x/week for 90 days    Plan of Care/Certification Expiration Date: 01/10/23      PT Visit Info:  No data recorded       OUTPATIENT PHYSICAL THERAPY 11/10/2022     Appt Desk   Episode   MyChart      Ms. Crook cancelled due to having the stomach bug    Stephenie Hill, PT    Future Appointments   Date Time Provider Pradeep Wilder   2022  4:00 PM Stephenie Hill PT [facility]   2022  4:00 PM Stephenie Hill PT [facility]   2022 11:45 AM SFE MOBILE MAM[facility] SFE   2023  9:00 AM PRE LAB PRE [facility] AMB   2023  8:40 AM Johnie Neves MD PRE [facility] AMB   2023 11:20 AM Miller Apley Merck, APRN - CNP PSCD [facility] AMB

## 2022-11-16 ENCOUNTER — HOSPITAL ENCOUNTER (OUTPATIENT)
Dept: PHYSICAL THERAPY | Age: 62
Setting detail: RECURRING SERIES
Discharge: HOME | End: 2022-11-19
Payer: COMMERCIAL

## 2022-11-16 PROCEDURE — 97110 THERAPEUTIC EXERCISES: CPT

## 2022-11-16 ASSESSMENT — PAIN SCALES - GENERAL: PAINLEVEL_OUTOF10: 1

## 2022-11-16 NOTE — PROGRESS NOTES
Timur Arnold  : 1960  Primary: [employer]  Secondary:  [address] @ [facility]  [address] [facility] [place] [address]  Phone: [phone]  Fax: [phone] Plan Frequency: 2x/week for 90 days    Plan of Care/Certification Expiration Date: 01/10/23      PT Visit Info:  No data recorded   Visit Count:  8   OUTPATIENT PHYSICAL THERAPY:OP NOTE TYPE: Treatment Note 2022       Episode  }Appt Desk             Treatment Diagnosis:  Pain in left hip (M25.552)  Stiffness of Left Hip, Not elsewhere classified (M25.652)  Pain in Right Hip (M25.551)  Stiffness of Right Hip, Not elsewhere classified (M25.651)  Medical/Referring Diagnosis:  It band syndrome, left [M76.32]  Bilateral hip pain [M25.551, M25.552]  Referring Physician:  Rubi Eller MD MD Orders:  PT Eval and Treat   Date of Onset:  Onset Date: 22     Allergies:   Patient has no known allergies. Restrictions/Precautions:  No data recorded  No data recorded   Interventions Planned (Treatment may consist of any combination of the following):    Current Treatment Recommendations: Strengthening; ROM; Functional mobility training; Transfer training; ADL/Self-care training; Stair training; Neuromuscular re-education; Manual Therapy - Soft Tissue Mobilization; Manual Therapy - Joint Manipulation; Pain management; Home exercise program; Safety education & training; Patient/Caregiver education & training; Modalities; Integrated dry needling; Therapeutic activities     Subjective Comments:  Pt states she is doing well. She states she had some discomfort after navigating several flights of stairs at work but today she is feeling good.   Initial:}    1/10Post Session:       0/10  Medications Last Reviewed:  2022  Updated Objective Findings:  See evaluation note from today  Treatment   THERAPEUTIC EXERCISE: (45 minutes):    Exercises per grid below to improve mobility, strength, and coordination. Required minimal visual, verbal, manual, and tactile cues to promote proper body posture and promote proper body mechanics. Progressed resistance, range, repetitions, and complexity of movement as indicated. Date:  11/3 Date:  11/8 Date:  11/16   Activity/Exercise      Education      NuStep  x10min x10min   Bridges  2x12 Blue TB  holding iso ABD  1x10 SL    Lumbar Rotation      Clams      Sidestepping 3x5 25lbs  3x35ft green TB    Hip Flexion      Supine Marches      Hurdles at table  To the side   2x10 B    Balance x3min x3min    Sit to Stand  3x10 10lbs      Step ups 3x10 B8\" box 10lbs     Hip Abd 3x10 B 27.5lbs 2x15 17.5 lbs 3x10 25lbs   Monster Walks  3x35ft green tb     Tandem Walking 3x35ft     [place] x10min     Side Step Ups 3x10 B     Pallof Press 2x10 blue TB     Side Lunges 3x35ft w/ 10lbs     Calf Stretch  [id number]    Hip Stretch  x3min    Hamstring Stretch  x3min    Shuttle  DL 2x10 125lbs  Sl 2x10 50lbs DL 2x10 150lbs  Sl 2x10 62lbs  Single Leg Hops   2x12 B   Pistol Squats    3x8   Ski Jumps w/ resistance   3x8 B   Balance on Bosu ball   x3min       MANUAL THERAPY: (0 minutes):   Joint mobilization, Soft tissue mobilization, and Manipulation was utilized and necessary because of the patient's restricted joint motion, painful spasm, loss of articular motion, and restricted motion of soft tissue. Date:  10/12 Date:  10/19 Date:  10/25   Activity/Exercise Parameters Parameters Parameters   Soft Tissue  R glute med R glute med, piriformis   Joint Mobilization B Hips: LAD, SAD L Hip: A-P grades II-IV    Dry Needling            Treatment/Session Summary:    Treatment Assessment:  Pt tolerated tx well and continues to show improvement in endurance and strength. Communication/Consultation:  None today  Equipment provided today:  None  Recommendations/Intent for next treatment session: Next visit will focus on progression as tolerated.     Total Treatment Billable Duration:  45 minutes Rick Hernandez       Charge Capture  }Post Session Pain  PT Visit Info  Lisseth Nail Portal  MD Guidelines  Scanned Media  Benefits  MyChart    Future Appointments   Date Time Provider [place]   11/16/2022  4:00 PM Josue Jones, PT [facility]   11/21/2022  4:00 PM Josue Jones, PT [facility]   12/20/2022 11:45 AM SFE MOBILE MAMMO [facility]MM SFE   2/9/2023  9:00 AM PRE LAB PRE [facility] AMB   2/16/2023  8:40 AM Charly Haddad MD PRE [facility] AMB   9/11/2023 11:20 AM Jocelyne Healy, APRN - CNP PSCD [facility] AMB

## 2022-11-21 ENCOUNTER — HOSPITAL ENCOUNTER (OUTPATIENT)
Dept: PHYSICAL THERAPY | Age: 62
Setting detail: RECURRING SERIES
Discharge: HOME | End: 2022-11-24
Payer: COMMERCIAL

## 2022-11-21 PROCEDURE — 97110 THERAPEUTIC EXERCISES: CPT

## 2022-11-21 ASSESSMENT — PAIN SCALES - GENERAL: PAINLEVEL_OUTOF10: 1

## 2022-11-21 NOTE — PROGRESS NOTES
Jaimie Ramos  : 1960  Primary: [employer]  Secondary:  [address] @ [facility]  [address] [facility] [address]  Phone: [phone]  Fax: [phone] Plan Frequency: 2x/week for 90 days    Plan of Care/Certification Expiration Date: 01/10/23      PT Visit Info:  No data recorded   Visit Count:  9   OUTPATIENT PHYSICAL THERAPY:OP NOTE TYPE: Treatment Note 2022       Episode  }Appt Desk             Treatment Diagnosis:  Pain in left hip (M25.552)  Stiffness of Left Hip, Not elsewhere classified (M25.652)  Pain in Right Hip (M25.551)  Stiffness of Right Hip, Not elsewhere classified (M25.651)  Medical/Referring Diagnosis:  It band syndrome, left [M76.32]  Bilateral hip pain [M25.551, M25.552]  Referring Physician:  Monica Vasquez MD MD Orders:  PT Eval and Treat   Date of Onset:  Onset Date: 22     Allergies:   Patient has no known allergies. Restrictions/Precautions:  No data recorded  No data recorded   Interventions Planned (Treatment may consist of any combination of the following):    Current Treatment Recommendations: Strengthening; ROM; Functional mobility training; Transfer training; ADL/Self-care training; Stair training; Neuromuscular re-education; Manual Therapy - Soft Tissue Mobilization; Manual Therapy - Joint Manipulation; Pain management; Home exercise program; Safety education & training; Patient/Caregiver education & training; Modalities; Integrated dry needling; Therapeutic activities     Subjective Comments:  Pt stated she has been more active and she is having less pain and discomfort. Initial:}    1/10Post Session:       0/10  Medications Last Reviewed:  2022  Updated Objective Findings:  See evaluation note from today  Treatment   THERAPEUTIC EXERCISE: (45 minutes):    Exercises per grid below to improve mobility, strength, and coordination.   Required minimal visual, verbal, manual, and tactile cues to promote proper body posture and promote proper body mechanics. Progressed resistance, range, repetitions, and complexity of movement as indicated. Date:  11/8 Date:  11/16 Date:  11/21   Activity/Exercise      Education      NuStep x10min x10min    Treadmill   x10min   Bridges 2x12 Blue TB  holding iso ABD  1x10 SL     Lumbar Rotation      Clams      Sidestepping 3x35ft green TB     Hip Flexion      Supine Marches      Hurdles at table To the side   2x10 B     Balance x3min  x3min   Sit to Stand    3x10 10'    Step ups   3x10   Hip Abd 2x15 17.5 lbs 3x10 25lbs 2x12 17.5   Monster Walks 3x35ft green tb      Tandem Walking   3x35ft   [place]      Side Step Ups      Pallof Press      Side Lunges   3x35ft   Calf Stretch [id number]  [id number]   Hip Stretch x3min     Hamstring Stretch x3min     Shuttle DL 2x10 125lbs  Sl 2x10 50lbs DL 2x10 150lbs  Sl 2x10 62lbs  Single Leg Hops   2x12 B DL 2x10 150lbs  Sl 2x10 62lbs  Single Leg Hops   2x12 B   Pistol Squats   3x8 2x10   Ski Jumps w/ resistance  3x8 B    Balance on Bosu ball  x3min x3min       MANUAL THERAPY: (0 minutes):   Joint mobilization, Soft tissue mobilization, and Manipulation was utilized and necessary because of the patient's restricted joint motion, painful spasm, loss of articular motion, and restricted motion of soft tissue. Date:  10/12 Date:  10/19 Date:  10/25   Activity/Exercise Parameters Parameters Parameters   Soft Tissue  R glute med R glute med, piriformis   Joint Mobilization B Hips: LAD, SAD L Hip: A-P grades II-IV    Dry Needling            Treatment/Session Summary:    Treatment Assessment:  Pt continues to imporve with her strength. Will progress to dynamic exercises and improving balance. Communication/Consultation:  None today  Equipment provided today:  None  Recommendations/Intent for next treatment session: Next visit will focus on progression as tolerated.     Total Treatment Billable Duration:  45 minutes       Letitia Thorne Charge Capture  }Post Session Pain  PT Visit Info  [employer] Portal  MD Guidelines  Scanned Media  Benefits  MyChart    Future Appointments   Date Time Provider Pradeep Tina   12/20/2022 11:45 AM SFE MOBILE MAMMO [facility] SFE   2/9/2023  9:00 AM PRE LAB PRE [facility]VL AMB   2/16/2023  8:40 AM Gennaro Márquez MD PRE [facility] AMB   9/11/2023 11:20 AM Alexandra Healy, APRN - CNP PSCD [facility] AMB

## 2022-12-01 ENCOUNTER — HOSPITAL ENCOUNTER (OUTPATIENT)
Dept: PHYSICAL THERAPY | Age: 62
Setting detail: RECURRING SERIES
Discharge: HOME | End: 2022-12-04
Payer: COMMERCIAL

## 2022-12-01 PROCEDURE — 97110 THERAPEUTIC EXERCISES: CPT

## 2022-12-01 ASSESSMENT — PAIN SCALES - GENERAL: PAINLEVEL_OUTOF10: 0

## 2022-12-01 NOTE — PROGRESS NOTES
Chevy Wan  : 1960  Primary: [employer]  Secondary:  [address] @ [facility]  [address] [facility] [address]  Phone: [phone]  Fax: [phone] Plan Frequency: 2x/week for 90 days    Plan of Care/Certification Expiration Date: 01/10/23      PT Visit Info:  No data recorded   Visit Count:  10   OUTPATIENT PHYSICAL THERAPY:OP NOTE TYPE: Treatment Note 2022       Episode  }Appt Desk             Treatment Diagnosis:  Pain in left hip (M25.552)  Stiffness of Left Hip, Not elsewhere classified (M25.652)  Pain in Right Hip (M25.551)  Stiffness of Right Hip, Not elsewhere classified (M25.651)  Medical/Referring Diagnosis:  It band syndrome, left [M76.32]  Bilateral hip pain [M25.551, M25.552]  Referring Physician:  Zina Robbins MD MD Orders:  PT Eval and Treat   Date of Onset:  Onset Date: 22     Allergies:   Patient has no known allergies. Restrictions/Precautions:  No data recorded  No data recorded   Interventions Planned (Treatment may consist of any combination of the following):    Current Treatment Recommendations: Strengthening; ROM; Functional mobility training; Transfer training; ADL/Self-care training; Stair training; Neuromuscular re-education; Manual Therapy - Soft Tissue Mobilization; Manual Therapy - Joint Manipulation; Pain management; Home exercise program; Safety education & training; Patient/Caregiver education & training; Modalities; Integrated dry needling; Therapeutic activities     Subjective Comments:  Pt states she is doing well and has no pain. Initial:}    0/10Post Session:       0/10  Medications Last Reviewed:  2022  Updated Objective Findings:  See evaluation note from today  Treatment   THERAPEUTIC EXERCISE: (45 minutes):    Exercises per grid below to improve mobility, strength, and coordination.   Required minimal visual, verbal, manual, and tactile cues to promote proper body posture and promote proper body mechanics. Progressed resistance, range, repetitions, and complexity of movement as indicated. Date:  11/16 Date:  11/21 Date:  12/1   Activity/Exercise      Education      NuStep x10min     Treadmill  x10min x10min   Bridges      Lumbar Rotation   x20   Clams      Sidestepping      Hip Flexion      Hip Ext   3x10 50lbs    Supine Marches      Hurdles at table      Balance  x3min    Sit to Stand   3x10 10'     Step ups  3x10    Hip Abd 3x10 25lbs 2x12 17.5    Monster Walks      Tandem Walking  3x35ft    [place]      Side Step Ups      Pallof Press      Dead Bugs   3x8 B   Side Lunges  3x35ft    Side Planks       Calf Stretch  [id number]    Hip Stretch      Hamstring Stretch      Shuttle DL 2x10 150lbs  Sl 2x10 62lbs  Single Leg Hops   2x12 B DL 2x10 150lbs  Sl 2x10 62lbs  Single Leg Hops   2x12 B DL 2x10 162lbs  Sl 2x10 75lbs  Single Leg Hops   2x12 B   Pistol Squats  3x8 2x10    Ski Jumps w/ resistance 3x8 B     Balance on Bosu ball x3min x3min x3min       MANUAL THERAPY: (0 minutes):   Joint mobilization, Soft tissue mobilization, and Manipulation was utilized and necessary because of the patient's restricted joint motion, painful spasm, loss of articular motion, and restricted motion of soft tissue. Date:  10/12 Date:  10/19 Date:  10/25   Activity/Exercise Parameters Parameters Parameters   Soft Tissue  R glute med R glute med, piriformis   Joint Mobilization B Hips: LAD, SAD L Hip: A-P grades II-IV    Dry Needling            Treatment/Session Summary:    Treatment Assessment:  Pt tolerated tx well and met all goals to get discharged. Communication/Consultation:  None today  Equipment provided today:  None  Recommendations/Intent for next treatment session: Next visit will focus on progression as tolerated.     Total Treatment Billable Duration:  45 minutes       Rick [employer] Capture  }Post Session Pain  PT Visit Info  [employer] Portal  MD Guidelines  Scanned Media Benefits  MyChart    Future Appointments   Date Time Provider Pradeep Wilder   12/20/2022 11:45 AM S[facility] MOBILE MAMMO [facility] SFE   2/9/2023  9:00 AM PRE LAB PRE [facility]VL AMB   2/16/2023  8:40 AM Maricarmen Brice MD PRE [facility]VL AMB   9/11/2023 11:20 AM Trevor Healy, APRN - CNP PSCD [facility] AMB

## 2022-12-01 NOTE — THERAPY DISCHARGE
Lorie Rand  : 1960  Primary: [employer]  Secondary:  [address] @ [facility]  Therapy  [address] [facility] [place] [address]  Phone: [phone]  Fax: [phone] Plan Frequency: 2x/week for 90 days  Plan of Care/Certification Expiration Date: 01/10/23      PT Visit Info:         Visit Count:  10    OUTPATIENT PHYSICAL THERAPY:OP NOTE TYPE: Discharge Summary 2022               Episode  Appt Desk         Treatment Diagnosis:  Pain in left hip (M25.552)  Stiffness of Left Hip, Not elsewhere classified (M25.652)  Pain in Right Hip (M25.551)  Stiffness of Right Hip, Not elsewhere classified (M25.651)  Medical/Referring Diagnosis:  It band syndrome, left [M76.32]  Bilateral hip pain [M25.551, M25.552]  Referring Physician:  Ellie Keith MD MD Orders:  PT Eval and Treat   Return MD Appt:    Date of Onset:  Onset Date: 22     Allergies:  Patient has no known allergies. Restrictions/Precautions:           Medications Last Reviewed:  2022     SUBJECTIVE   History of Injury/Illness (Reason for Referral):  Pt states she has been having bilateral hip pain (R>L) since July/2022. Pain is primarily located in the posterior-lateral aspect of the hip and is a constant dull/aching pain that doesn't vary much. Will occasionally get a sharp pain in the low back. Groin pain with over activity. Pt also reports some radiating L LE pain. This pain goes down the lateral aspect of the leg to the calf and foot and happens with over activity and only comes on at night (1-2x in last few weeks). Eases: moving, NSAIDs  Aggs: sitting  Patient Stated Goal(s):   \"Decrease hip pain, improve function\"  Initial:     010 Post Session:     0/10  Past Medical History/Comorbidities:   Ms. Cora Pat  has a past medical history of Allergic conjunctivitis, Allergic rhinitis, Diabetes mellitus type 2, controlled ([address] Fibroadenosis of breast, Hypercholesterolemia, Hypertension, Multiple thyroid nodules, Sinusitis, and Type 2 diabetes mellitus ([facility] [address] Ms. Allyson Barbour  has a past surgical history that includes Breast biopsy (03/18/2011); Hysterectomy (2001); and Colonoscopy (12/15/2015). Social History/Living Environment:   Lives With: Spouse  Type of Home: House  Home Layout: Two level     Prior Level of Function/Work/Activity:   Prior level of function: Walking 3mi daily  Prior level of function: Independent           Learning:   Does the patient/guardian have any barriers to learning?: No barriers  Will there be a co-learner?: No  What is the preferred language of the patient/guardian?: English  Is an  required?: No  How does the patient/guardian prefer to learn new concepts?: Listening; Reading; Demonstration; Pictures/Videos     Fall Risk Scale: Ortega Total Score: 0  Ortega Fall Risk: Low (0-24)     Personal Factors:        Sex:  female        Age:  65 y.o. OBJECTIVE   Observation  Posture: normal  Gait: normal      ROM   Right Left   Flexion 100 100   Extension 10 10   ER 40 40   IR 5 5     Strength (all MMT scores are graded on a scale of 0-5)   Right Left   Hip      Flexion 5 5   Abduction 4+ 4+   Extension 5 5   Glute Max 5 5   ER 5 5   IR 5 5   Knee     Extension 5 5   Flexion 5 5       Joint/Soft Tissue Mobility   Description   Joint Mobility Hip: normal  Lumbar: normal   Soft Tissue Mobility No TTP       Special Tests   Right Left   FADDIR - -   FE - -   Scour's - -   Log Roll - -     Functional Mobility Screen  30sec Sit to Stand:  TUG:  DL Squat:  SL Squat  R:  L:      ASSESSMENT   Initial Assessment:  Cande Leos presents to physical therapy with MD diagnosis of a bilateral hip pain. Pt demonstrated signs and symptoms consistent with this diagnosis.  On objective examination, the patient demonstrated deficits in ROM, strength, joint mobility, soft tissue mobility, functional ability, functional mobility, and ambulatory ability. The patient also had increased pain. The patient is limited in the following activities: walking, standing, sitting, stairs, work, ADLs, functional activities, exercise. The patient has a good  prognosis for recovery based on the examination findings and may be limited by: N/A. Patient requires skilled physical therapy services in order to return to prior level of function. Discharge Assessment: Luisito Croft presented to physical therapy with MD diagnosis of a bilateral hip pain. Pt demonstrated signs and symptoms consistent with this diagnosis. On objective examination, the patient demonstrated improvements in ROM, strength, joint mobility, soft tissue mobility, functiaonl ability, functional mobility. The patient also has decreased pain These improvements have increased the patient's ability to: walk, stand, sit, stairs, work, ADLs, functional tasks, exercise. The patient is still limited in the following activities: prolonged walking. The patient is being discharged from PT at this time due to meeting goals and being independent with HEP         PLAN     Goals: (Goals have been discussed and agreed upon with patient.)  Short-Term Functional Goals: Time Frame: 4 weeks  Pt will be compliant with progressive HEP-- goal met  Pt will be able to go up stairs at work with < 3/10 pain-- goal met  Discharge Goals: Time Frame: 10 weeks  Pt will improve HOOS Jr score by 5pts-- goal met  Pt will be able to walk 2mi with pain < 3/10-- goal met         Outcome Measure: Tool Used: Hip dysfunction and Osteoarthritis Outcome Score for Joint Replacement (HOOS, JR)  Score:  Initial: 10/24 Most Recent: 0/24 (12/1/2022)   Interpretation of Score: The HOOS, JR contains 6 items from the original HOOS survey. Items are coded from 0 to 4, none to extreme respectively. Geronimo Hernandez is scored by summing the raw response (range 0-24) and then converting it to an interval score using the table provided below.  The interval score ranges from 0 to 100 where 0 represents total hip disability and 100 represents perfect hip health. Total Duration:       Regarding Deann Crook's therapy, I certify that the treatment plan above will be carried out by a therapist or under their direction. Thank you for this referral,  Stephenie Hill, PT     Referring Physician Signature: Armando Justice* No Signature is Required for this note.         Post Session Pain  Charge Capture  PT Visit Info MD Guidelines  MyChart

## 2022-12-07 ENCOUNTER — APPOINTMENT (OUTPATIENT)
Dept: PHYSICAL THERAPY | Age: 62
End: 2022-12-07
Payer: COMMERCIAL

## 2022-12-12 ENCOUNTER — APPOINTMENT (OUTPATIENT)
Dept: PHYSICAL THERAPY | Age: 62
End: 2022-12-12
Payer: COMMERCIAL

## 2022-12-14 ENCOUNTER — APPOINTMENT (OUTPATIENT)
Dept: PHYSICAL THERAPY | Age: 62
End: 2022-12-14
Payer: COMMERCIAL

## 2022-12-20 ENCOUNTER — HOSPITAL ENCOUNTER (OUTPATIENT)
Dept: MAMMOGRAPHY | Age: 62
Discharge: HOME | End: 2022-12-23
Payer: COMMERCIAL

## 2022-12-20 DIAGNOSIS — Z12.31 VISIT FOR SCREENING MAMMOGRAM: ICD-10-CM

## 2022-12-20 PROCEDURE — 77067 SCR MAMMO BI INCL CAD: CPT

## 2023-01-12 RX ORDER — NEBIVOLOL 5 MG/1
5 TABLET ORAL NIGHTLY
Qty: 30 TABLET | Refills: 0 | Status: SHIPPED | OUTPATIENT
Start: 2023-01-12

## 2023-01-12 RX ORDER — EMPAGLIFLOZIN, METFORMIN HYDROCHLORIDE 12.5; 1 MG/1; MG/1
2 TABLET, EXTENDED RELEASE ORAL DAILY
Qty: 60 TABLET | Refills: 0 | Status: SHIPPED | OUTPATIENT
Start: 2023-01-12

## 2023-01-12 NOTE — TELEPHONE ENCOUNTER
Patient Comment: I have an appointment next month but do not have enough to make it to my appointment

## 2023-01-31 RX ORDER — SIMVASTATIN 20 MG
20 TABLET ORAL NIGHTLY
Qty: 30 TABLET | Refills: 0 | Status: SHIPPED | OUTPATIENT
Start: 2023-01-31

## 2023-02-08 DIAGNOSIS — E78.2 MIXED HYPERLIPIDEMIA: ICD-10-CM

## 2023-02-08 DIAGNOSIS — I10 ESSENTIAL (PRIMARY) HYPERTENSION: ICD-10-CM

## 2023-02-08 DIAGNOSIS — E11.9 CONTROLLED TYPE 2 DIABETES MELLITUS WITHOUT COMPLICATION, WITHOUT LONG-TERM CURRENT USE OF INSULIN (HCC): Primary | ICD-10-CM

## 2023-02-09 ENCOUNTER — NURSE ONLY (OUTPATIENT)
Dept: FAMILY MEDICINE CLINIC | Facility: CLINIC | Age: 63
End: 2023-02-09

## 2023-02-09 DIAGNOSIS — I10 ESSENTIAL (PRIMARY) HYPERTENSION: ICD-10-CM

## 2023-02-09 DIAGNOSIS — E11.9 CONTROLLED TYPE 2 DIABETES MELLITUS WITHOUT COMPLICATION, WITHOUT LONG-TERM CURRENT USE OF INSULIN (HCC): ICD-10-CM

## 2023-02-09 DIAGNOSIS — E78.2 MIXED HYPERLIPIDEMIA: ICD-10-CM

## 2023-02-10 LAB
ALBUMIN SERPL-MCNC: 4.1 G/DL (ref 3.2–4.6)
ALBUMIN/GLOB SERPL: 1.4 (ref 0.4–1.6)
ALP SERPL-CCNC: 103 U/L (ref 50–136)
ALT SERPL-CCNC: 32 U/L (ref 12–65)
ANION GAP SERPL CALC-SCNC: 10 MMOL/L (ref 2–11)
AST SERPL-CCNC: 14 U/L (ref 15–37)
BILIRUB SERPL-MCNC: 0.4 MG/DL (ref 0.2–1.1)
BUN SERPL-MCNC: 18 MG/DL (ref 8–23)
CALCIUM SERPL-MCNC: 9.7 MG/DL (ref 8.3–10.4)
CHLORIDE SERPL-SCNC: 108 MMOL/L (ref 101–110)
CHOLEST SERPL-MCNC: 95 MG/DL
CO2 SERPL-SCNC: 25 MMOL/L (ref 21–32)
CREAT SERPL-MCNC: 1 MG/DL (ref 0.6–1)
EST. AVERAGE GLUCOSE BLD GHB EST-MCNC: 160 MG/DL
GLOBULIN SER CALC-MCNC: 2.9 G/DL (ref 2.8–4.5)
GLUCOSE SERPL-MCNC: 156 MG/DL (ref 65–100)
HBA1C MFR BLD: 7.2 % (ref 4.8–5.6)
HDLC SERPL-MCNC: 32 MG/DL (ref 40–60)
HDLC SERPL: 3
LDLC SERPL CALC-MCNC: 42.4 MG/DL
POTASSIUM SERPL-SCNC: 4.2 MMOL/L (ref 3.5–5.1)
PROT SERPL-MCNC: 7 G/DL (ref 6.3–8.2)
SODIUM SERPL-SCNC: 143 MMOL/L (ref 133–143)
TRIGL SERPL-MCNC: 103 MG/DL (ref 35–150)
VLDLC SERPL CALC-MCNC: 20.6 MG/DL (ref 6–23)

## 2023-02-14 SDOH — ECONOMIC STABILITY: HOUSING INSECURITY
IN THE LAST 12 MONTHS, WAS THERE A TIME WHEN YOU DID NOT HAVE A STEADY PLACE TO SLEEP OR SLEPT IN A SHELTER (INCLUDING NOW)?: NO

## 2023-02-14 SDOH — ECONOMIC STABILITY: INCOME INSECURITY: HOW HARD IS IT FOR YOU TO PAY FOR THE VERY BASICS LIKE FOOD, HOUSING, MEDICAL CARE, AND HEATING?: NOT HARD AT ALL

## 2023-02-14 SDOH — ECONOMIC STABILITY: TRANSPORTATION INSECURITY
IN THE PAST 12 MONTHS, HAS LACK OF TRANSPORTATION KEPT YOU FROM MEETINGS, WORK, OR FROM GETTING THINGS NEEDED FOR DAILY LIVING?: NO

## 2023-02-14 SDOH — ECONOMIC STABILITY: FOOD INSECURITY: WITHIN THE PAST 12 MONTHS, YOU WORRIED THAT YOUR FOOD WOULD RUN OUT BEFORE YOU GOT MONEY TO BUY MORE.: NEVER TRUE

## 2023-02-14 SDOH — ECONOMIC STABILITY: FOOD INSECURITY: WITHIN THE PAST 12 MONTHS, THE FOOD YOU BOUGHT JUST DIDN'T LAST AND YOU DIDN'T HAVE MONEY TO GET MORE.: NEVER TRUE

## 2023-02-16 ENCOUNTER — OFFICE VISIT (OUTPATIENT)
Dept: FAMILY MEDICINE CLINIC | Facility: CLINIC | Age: 63
End: 2023-02-16
Payer: COMMERCIAL

## 2023-02-16 VITALS
TEMPERATURE: 97.3 F | HEIGHT: 62 IN | BODY MASS INDEX: 33.86 KG/M2 | WEIGHT: 184 LBS | SYSTOLIC BLOOD PRESSURE: 123 MMHG | HEART RATE: 67 BPM | OXYGEN SATURATION: 95 % | RESPIRATION RATE: 19 BRPM | DIASTOLIC BLOOD PRESSURE: 69 MMHG

## 2023-02-16 DIAGNOSIS — I10 ESSENTIAL HYPERTENSION, BENIGN: ICD-10-CM

## 2023-02-16 DIAGNOSIS — E78.2 MIXED HYPERLIPIDEMIA: Primary | ICD-10-CM

## 2023-02-16 DIAGNOSIS — E11.9 CONTROLLED TYPE 2 DIABETES MELLITUS WITHOUT COMPLICATION, WITHOUT LONG-TERM CURRENT USE OF INSULIN (HCC): ICD-10-CM

## 2023-02-16 PROCEDURE — 3074F SYST BP LT 130 MM HG: CPT | Performed by: FAMILY MEDICINE

## 2023-02-16 PROCEDURE — 3051F HG A1C>EQUAL 7.0%<8.0%: CPT | Performed by: FAMILY MEDICINE

## 2023-02-16 PROCEDURE — 3078F DIAST BP <80 MM HG: CPT | Performed by: FAMILY MEDICINE

## 2023-02-16 PROCEDURE — 99214 OFFICE O/P EST MOD 30 MIN: CPT | Performed by: FAMILY MEDICINE

## 2023-02-16 RX ORDER — TIRZEPATIDE 5 MG/.5ML
5 INJECTION, SOLUTION SUBCUTANEOUS WEEKLY
Qty: 4 ADJUSTABLE DOSE PRE-FILLED PEN SYRINGE | Refills: 2 | Status: SHIPPED | OUTPATIENT
Start: 2023-02-16

## 2023-02-16 RX ORDER — NEBIVOLOL 5 MG/1
5 TABLET ORAL NIGHTLY
Qty: 90 TABLET | Refills: 1 | Status: SHIPPED | OUTPATIENT
Start: 2023-02-16

## 2023-02-16 RX ORDER — PYRITHIONE ZINC 1 G/ML
LOTION/SHAMPOO TOPICAL 3 TIMES DAILY
COMMUNITY

## 2023-02-16 RX ORDER — SIMVASTATIN 20 MG
20 TABLET ORAL NIGHTLY
Qty: 90 TABLET | Refills: 1 | Status: SHIPPED | OUTPATIENT
Start: 2023-02-16

## 2023-02-16 RX ORDER — TIRZEPATIDE 2.5 MG/.5ML
2.5 INJECTION, SOLUTION SUBCUTANEOUS WEEKLY
Qty: 4 ADJUSTABLE DOSE PRE-FILLED PEN SYRINGE | Refills: 0 | Status: SHIPPED | OUTPATIENT
Start: 2023-02-16

## 2023-02-16 RX ORDER — EMPAGLIFLOZIN, METFORMIN HYDROCHLORIDE 12.5; 1 MG/1; MG/1
2 TABLET, EXTENDED RELEASE ORAL DAILY
Qty: 180 TABLET | Refills: 1 | Status: SHIPPED | OUTPATIENT
Start: 2023-02-16

## 2023-02-16 ASSESSMENT — ENCOUNTER SYMPTOMS
VOMITING: 0
CONSTIPATION: 0
COUGH: 0
DIARRHEA: 0
SHORTNESS OF BREATH: 0

## 2023-02-16 ASSESSMENT — PATIENT HEALTH QUESTIONNAIRE - PHQ9
2. FEELING DOWN, DEPRESSED OR HOPELESS: 0
SUM OF ALL RESPONSES TO PHQ QUESTIONS 1-9: 0
1. LITTLE INTEREST OR PLEASURE IN DOING THINGS: 0
SUM OF ALL RESPONSES TO PHQ QUESTIONS 1-9: 0
SUM OF ALL RESPONSES TO PHQ QUESTIONS 1-9: 0
SUM OF ALL RESPONSES TO PHQ9 QUESTIONS 1 & 2: 0
SUM OF ALL RESPONSES TO PHQ QUESTIONS 1-9: 0

## 2023-02-16 NOTE — PROGRESS NOTES
Ilsa Zavaleta (:  1960) is a 65 y.o. female,Established patient, here for evaluation of the following chief complaint(s):  6 Month Follow-Up (On Diabetes, HTN and Lipids. Had blood work prior to the visit.) and Other (Had never had a Shingels shot, and had not had a Pneumonia shot in the last 10 years. Has been over a year since last Diabetic foot and Eye exam, but does have eye exam schedule later this year.)         ASSESSMENT/PLAN:  1. Mixed hyperlipidemia  -     simvastatin (ZOCOR) 20 MG tablet; Take 1 tablet by mouth nightly, Disp-90 tablet, R-1Normal  2. Controlled type 2 diabetes mellitus without complication, without long-term current use of insulin (HCC)  -     Empagliflozin-metFORMIN HCl ER (SYNJARDY XR) 12.5-1000 MG TB24; Take 2 tablets by mouth daily, Disp-180 tablet, R-1Normal  -     Tirzepatide (MOUNJARO) 5 MG/0.5ML SOPN SC injection; Inject 0.5 mLs into the skin once a week, Disp-4 Adjustable Dose Pre-filled Pen Syringe, R-2Normal  -     Tirzepatide (MOUNJARO) 2.5 MG/0.5ML SOPN SC injection; Inject 0.5 mLs into the skin once a week, Disp-4 Adjustable Dose Pre-filled Pen Syringe, R-0Normal  3. Essential hypertension, benign  -     nebivolol (BYSTOLIC) 5 MG tablet; Take 1 tablet by mouth at bedtime TAKE 1 TABLET BY MOUTH NIGHTLY, Disp-90 tablet, R-1Normal  Labs done prior to appointment were reviewed in person today. Refilled [facility] and [place]. Hip xrays ordered. If no bony abnormality and persistent lateral hip pain, may come back for steroid injections and or we can do PT. She does have some relief with stretching, so she can try this as well as new shoes. Overdue for colo, but is supposed to get one soon. I don't see that order. I went ahead and put in a referral for this as well. Chest lesion suspect solar lentigo w/ some potential AK. Asked her to take a picture next to coin to keep an eye on this and return if any concerns and we will biopsy.          Return in about 3 months (around 5/16/2023) for labs 1 week prior, f/u diabetes since starting mounjaro. Subjective   SUBJECTIVE/OBJECTIVE:  HPI  Chief Complaint   Patient presents with    6 Month Follow-Up     On Diabetes, HTN and Lipids. Had blood work prior to the visit. Other     Had never had a Shingels shot, and had not had a Pneumonia shot in the last 10 years. Has been over a year since last Diabetic foot and Eye exam, but does have eye exam schedule later this year. F/U HTN - BP well controlled. Compliant w/ med(s). Denies s/s target organ damage. Bystolic  F/U DM - blood sugars well controlled per pt. Compliant w/ meds w/o adverse effects. Denies s/s uncontrolled DM or of target organ damage. Lab Results   Component Value Date    LABA1C 7.2 (H) 02/09/2023     Lab Results   Component Value Date     02/09/2023     Taking synjardy and [place] [facility] and [place] consistently. Diet has been more liberal and now diabetes is uncontrolled. F/U  Dyslipidemia -  Pt states compliant w/ regimen w/o medication adverse effects. Zocor 20mg  F/U allergic rhinitis - well controlled on current regimen. Denies nasal/ocular sx's. Zyrtec and flonase    Hip pain has improved and she needs to be walking more. Review of Systems   Constitutional:  Negative for diaphoresis and unexpected weight change. HENT:  Negative for congestion. Eyes:  Negative for visual disturbance. Respiratory:  Negative for cough and shortness of breath. Cardiovascular:  Negative for chest pain. Gastrointestinal:  Negative for constipation, diarrhea and vomiting. Genitourinary:  Negative for dysuria. Neurological:  Negative for headaches. Psychiatric/Behavioral:  Negative for dysphoric mood and sleep disturbance. The patient is not nervous/anxious. Objective   Physical Exam  Vitals reviewed. Constitutional:       Appearance: Normal appearance. She is obese. HENT:      Head: Normocephalic.    Eyes:      Extraocular Movements: Extraocular movements intact. Conjunctiva/sclera: Conjunctivae normal.      Pupils: Pupils are equal, round, and reactive to light. Cardiovascular:      Rate and Rhythm: Normal rate and regular rhythm. Heart sounds: Normal heart sounds. No murmur heard. Pulmonary:      Effort: Pulmonary effort is normal. No respiratory distress. Breath sounds: Normal breath sounds. Abdominal:      General: Bowel sounds are normal.      Palpations: Abdomen is soft. Musculoskeletal:         General: Normal range of motion. Skin:     General: Skin is warm and dry. Neurological:      General: No focal deficit present. Mental Status: She is alert. Psychiatric:         Mood and Affect: Mood normal.         Behavior: Behavior normal.   /69 (Site: Left Upper Arm, Position: Sitting, Cuff Size: Large Adult)   Pulse 67   Temp 97.3 °F (36.3 °C)   Resp 19   Ht 5' 2\" (1.575 m)   Wt 184 lb (83.5 kg)   SpO2 95%   BMI 33.65 kg/m²       An electronic signature was used to authenticate this note.     --Gladys Luke MD

## 2023-05-08 DIAGNOSIS — E11.9 CONTROLLED TYPE 2 DIABETES MELLITUS WITHOUT COMPLICATION, WITHOUT LONG-TERM CURRENT USE OF INSULIN (HCC): Primary | ICD-10-CM

## 2023-05-08 DIAGNOSIS — E78.2 MIXED HYPERLIPIDEMIA: ICD-10-CM

## 2023-05-08 DIAGNOSIS — I10 ESSENTIAL (PRIMARY) HYPERTENSION: ICD-10-CM

## 2023-05-11 DIAGNOSIS — I10 ESSENTIAL (PRIMARY) HYPERTENSION: ICD-10-CM

## 2023-05-11 DIAGNOSIS — E11.9 CONTROLLED TYPE 2 DIABETES MELLITUS WITHOUT COMPLICATION, WITHOUT LONG-TERM CURRENT USE OF INSULIN (HCC): ICD-10-CM

## 2023-05-11 DIAGNOSIS — E78.2 MIXED HYPERLIPIDEMIA: ICD-10-CM

## 2023-05-11 LAB
ALBUMIN SERPL-MCNC: 4.4 G/DL (ref 3.2–4.6)
ALBUMIN/GLOB SERPL: 1.6 (ref 0.4–1.6)
ALP SERPL-CCNC: 66 U/L (ref 50–136)
ALT SERPL-CCNC: 37 U/L (ref 12–65)
ANION GAP SERPL CALC-SCNC: 3 MMOL/L (ref 2–11)
AST SERPL-CCNC: 17 U/L (ref 15–37)
BILIRUB SERPL-MCNC: 0.5 MG/DL (ref 0.2–1.1)
BUN SERPL-MCNC: 23 MG/DL (ref 8–23)
CALCIUM SERPL-MCNC: 9.5 MG/DL (ref 8.3–10.4)
CHLORIDE SERPL-SCNC: 110 MMOL/L (ref 101–110)
CHOLEST SERPL-MCNC: 86 MG/DL
CO2 SERPL-SCNC: 26 MMOL/L (ref 21–32)
CREAT SERPL-MCNC: 1 MG/DL (ref 0.6–1)
GLOBULIN SER CALC-MCNC: 2.8 G/DL (ref 2.8–4.5)
GLUCOSE SERPL-MCNC: 100 MG/DL (ref 65–100)
HDLC SERPL-MCNC: 36 MG/DL (ref 40–60)
HDLC SERPL: 2.4
LDLC SERPL CALC-MCNC: 36.6 MG/DL
POTASSIUM SERPL-SCNC: 4.1 MMOL/L (ref 3.5–5.1)
PROT SERPL-MCNC: 7.2 G/DL (ref 6.3–8.2)
SODIUM SERPL-SCNC: 139 MMOL/L (ref 133–143)
TRIGL SERPL-MCNC: 67 MG/DL (ref 35–150)
VLDLC SERPL CALC-MCNC: 13.4 MG/DL (ref 6–23)

## 2023-05-12 LAB
EST. AVERAGE GLUCOSE BLD GHB EST-MCNC: 117 MG/DL
HBA1C MFR BLD: 5.7 % (ref 4.8–5.6)

## 2023-05-18 ENCOUNTER — OFFICE VISIT (OUTPATIENT)
Dept: FAMILY MEDICINE CLINIC | Facility: CLINIC | Age: 63
End: 2023-05-18
Payer: COMMERCIAL

## 2023-05-18 VITALS
TEMPERATURE: 96.9 F | HEART RATE: 82 BPM | WEIGHT: 168 LBS | SYSTOLIC BLOOD PRESSURE: 107 MMHG | DIASTOLIC BLOOD PRESSURE: 69 MMHG | BODY MASS INDEX: 30.91 KG/M2 | OXYGEN SATURATION: 98 % | RESPIRATION RATE: 19 BRPM | HEIGHT: 62 IN

## 2023-05-18 DIAGNOSIS — J30.1 ALLERGIC RHINITIS DUE TO POLLEN, UNSPECIFIED SEASONALITY: ICD-10-CM

## 2023-05-18 DIAGNOSIS — I10 ESSENTIAL HYPERTENSION, BENIGN: Primary | ICD-10-CM

## 2023-05-18 DIAGNOSIS — E11.9 CONTROLLED TYPE 2 DIABETES MELLITUS WITHOUT COMPLICATION, WITHOUT LONG-TERM CURRENT USE OF INSULIN (HCC): ICD-10-CM

## 2023-05-18 PROCEDURE — 3078F DIAST BP <80 MM HG: CPT | Performed by: FAMILY MEDICINE

## 2023-05-18 PROCEDURE — 3074F SYST BP LT 130 MM HG: CPT | Performed by: FAMILY MEDICINE

## 2023-05-18 PROCEDURE — 99214 OFFICE O/P EST MOD 30 MIN: CPT | Performed by: FAMILY MEDICINE

## 2023-05-18 PROCEDURE — 3044F HG A1C LEVEL LT 7.0%: CPT | Performed by: FAMILY MEDICINE

## 2023-05-18 RX ORDER — FLUTICASONE PROPIONATE 50 MCG
2 SPRAY, SUSPENSION (ML) NASAL DAILY
Qty: 16 G | Refills: 2 | Status: SHIPPED | OUTPATIENT
Start: 2023-05-18

## 2023-05-18 RX ORDER — TIRZEPATIDE 5 MG/.5ML
5 INJECTION, SOLUTION SUBCUTANEOUS WEEKLY
Qty: 4 ADJUSTABLE DOSE PRE-FILLED PEN SYRINGE | Refills: 2 | Status: SHIPPED | OUTPATIENT
Start: 2023-05-18

## 2023-05-18 RX ORDER — NEBIVOLOL 2.5 MG/1
2.5 TABLET ORAL DAILY
Qty: 90 TABLET | Refills: 1 | Status: SHIPPED | OUTPATIENT
Start: 2023-05-18

## 2023-05-18 ASSESSMENT — PATIENT HEALTH QUESTIONNAIRE - PHQ9
SUM OF ALL RESPONSES TO PHQ QUESTIONS 1-9: 0
1. LITTLE INTEREST OR PLEASURE IN DOING THINGS: 0
SUM OF ALL RESPONSES TO PHQ QUESTIONS 1-9: 0
2. FEELING DOWN, DEPRESSED OR HOPELESS: 0
SUM OF ALL RESPONSES TO PHQ9 QUESTIONS 1 & 2: 0

## 2023-05-18 ASSESSMENT — ENCOUNTER SYMPTOMS
VOMITING: 0
COUGH: 0
CONSTIPATION: 0
DIARRHEA: 0
SHORTNESS OF BREATH: 0

## 2023-05-18 NOTE — PROGRESS NOTES
reactive to light. Cardiovascular:      Rate and Rhythm: Normal rate and regular rhythm. Heart sounds: Normal heart sounds. No murmur heard. Pulmonary:      Effort: Pulmonary effort is normal. No respiratory distress. Breath sounds: Normal breath sounds. Abdominal:      General: Bowel sounds are normal.      Palpations: Abdomen is soft. Musculoskeletal:         General: Normal range of motion. Skin:     General: Skin is warm and dry. Neurological:      General: No focal deficit present. Mental Status: She is alert. Psychiatric:         Mood and Affect: Mood normal.         Behavior: Behavior normal.   /69 (Site: Left Upper Arm, Position: Sitting, Cuff Size: Large Adult)   Pulse 82   Temp 96.9 °F (36.1 °C)   Resp 19   Ht 5' 2\" (1.575 m)   Wt 168 lb (76.2 kg)   SpO2 98%   BMI 30.73 kg/m²       An electronic signature was used to authenticate this note.     --Brandon Ch MD

## 2023-08-10 DIAGNOSIS — I10 ESSENTIAL HYPERTENSION, BENIGN: Primary | ICD-10-CM

## 2023-08-10 DIAGNOSIS — E11.9 CONTROLLED TYPE 2 DIABETES MELLITUS WITHOUT COMPLICATION, WITHOUT LONG-TERM CURRENT USE OF INSULIN (HCC): ICD-10-CM

## 2023-08-10 DIAGNOSIS — E78.2 MIXED HYPERLIPIDEMIA: ICD-10-CM

## 2023-08-10 RX ORDER — EMPAGLIFLOZIN, METFORMIN HYDROCHLORIDE 12.5; 1 MG/1; MG/1
2 TABLET, EXTENDED RELEASE ORAL DAILY
Qty: 180 TABLET | Refills: 1 | Status: SHIPPED | OUTPATIENT
Start: 2023-08-10

## 2023-08-14 DIAGNOSIS — E78.2 MIXED HYPERLIPIDEMIA: ICD-10-CM

## 2023-08-14 DIAGNOSIS — I10 ESSENTIAL HYPERTENSION, BENIGN: ICD-10-CM

## 2023-08-14 DIAGNOSIS — E11.9 CONTROLLED TYPE 2 DIABETES MELLITUS WITHOUT COMPLICATION, WITHOUT LONG-TERM CURRENT USE OF INSULIN (HCC): ICD-10-CM

## 2023-08-14 LAB
ALBUMIN SERPL-MCNC: 4.2 G/DL (ref 3.2–4.6)
ALBUMIN/GLOB SERPL: 1.5 (ref 0.4–1.6)
ALP SERPL-CCNC: 74 U/L (ref 50–136)
ALT SERPL-CCNC: 38 U/L (ref 12–65)
ANION GAP SERPL CALC-SCNC: 7 MMOL/L (ref 2–11)
AST SERPL-CCNC: 18 U/L (ref 15–37)
BILIRUB SERPL-MCNC: 0.6 MG/DL (ref 0.2–1.1)
BUN SERPL-MCNC: 15 MG/DL (ref 8–23)
CALCIUM SERPL-MCNC: 9.6 MG/DL (ref 8.3–10.4)
CHLORIDE SERPL-SCNC: 112 MMOL/L (ref 101–110)
CHOLEST SERPL-MCNC: 77 MG/DL
CO2 SERPL-SCNC: 26 MMOL/L (ref 21–32)
CREAT SERPL-MCNC: 0.9 MG/DL (ref 0.6–1)
GLOBULIN SER CALC-MCNC: 2.8 G/DL (ref 2.8–4.5)
GLUCOSE SERPL-MCNC: 112 MG/DL (ref 65–100)
HDLC SERPL-MCNC: 32 MG/DL (ref 40–60)
HDLC SERPL: 2.4
LDLC SERPL CALC-MCNC: 34.4 MG/DL
POTASSIUM SERPL-SCNC: 4.2 MMOL/L (ref 3.5–5.1)
PROT SERPL-MCNC: 7 G/DL (ref 6.3–8.2)
SODIUM SERPL-SCNC: 145 MMOL/L (ref 133–143)
TRIGL SERPL-MCNC: 53 MG/DL (ref 35–150)
VLDLC SERPL CALC-MCNC: 10.6 MG/DL (ref 6–23)

## 2023-08-16 LAB
EST. AVERAGE GLUCOSE BLD GHB EST-MCNC: 114 MG/DL
HBA1C MFR BLD: 5.6 % (ref 4.8–5.6)

## 2023-08-21 ENCOUNTER — OFFICE VISIT (OUTPATIENT)
Dept: FAMILY MEDICINE CLINIC | Facility: CLINIC | Age: 63
End: 2023-08-21
Payer: COMMERCIAL

## 2023-08-21 VITALS
TEMPERATURE: 96.6 F | HEART RATE: 76 BPM | BODY MASS INDEX: 29.63 KG/M2 | WEIGHT: 161 LBS | HEIGHT: 62 IN | RESPIRATION RATE: 19 BRPM | DIASTOLIC BLOOD PRESSURE: 65 MMHG | SYSTOLIC BLOOD PRESSURE: 120 MMHG | OXYGEN SATURATION: 97 %

## 2023-08-21 DIAGNOSIS — E11.9 CONTROLLED TYPE 2 DIABETES MELLITUS WITHOUT COMPLICATION, WITHOUT LONG-TERM CURRENT USE OF INSULIN (HCC): ICD-10-CM

## 2023-08-21 DIAGNOSIS — E78.2 MIXED HYPERLIPIDEMIA: ICD-10-CM

## 2023-08-21 DIAGNOSIS — L72.11 PILAR CYST OF SCALP: Primary | ICD-10-CM

## 2023-08-21 DIAGNOSIS — J30.1 ALLERGIC RHINITIS DUE TO POLLEN, UNSPECIFIED SEASONALITY: ICD-10-CM

## 2023-08-21 PROCEDURE — 3074F SYST BP LT 130 MM HG: CPT | Performed by: FAMILY MEDICINE

## 2023-08-21 PROCEDURE — 3044F HG A1C LEVEL LT 7.0%: CPT | Performed by: FAMILY MEDICINE

## 2023-08-21 PROCEDURE — 3078F DIAST BP <80 MM HG: CPT | Performed by: FAMILY MEDICINE

## 2023-08-21 PROCEDURE — 99214 OFFICE O/P EST MOD 30 MIN: CPT | Performed by: FAMILY MEDICINE

## 2023-08-21 RX ORDER — SIMVASTATIN 20 MG
20 TABLET ORAL NIGHTLY
Qty: 90 TABLET | Refills: 1 | Status: SHIPPED | OUTPATIENT
Start: 2023-08-21

## 2023-08-21 RX ORDER — FLUTICASONE PROPIONATE 50 MCG
2 SPRAY, SUSPENSION (ML) NASAL DAILY
Qty: 16 G | Refills: 2 | Status: SHIPPED | OUTPATIENT
Start: 2023-08-21

## 2023-08-21 RX ORDER — TIRZEPATIDE 5 MG/.5ML
5 INJECTION, SOLUTION SUBCUTANEOUS WEEKLY
Qty: 4 ADJUSTABLE DOSE PRE-FILLED PEN SYRINGE | Refills: 2 | Status: SHIPPED | OUTPATIENT
Start: 2023-08-21

## 2023-08-21 ASSESSMENT — ENCOUNTER SYMPTOMS
DIARRHEA: 0
CONSTIPATION: 0
COUGH: 0
VOMITING: 0
SHORTNESS OF BREATH: 0

## 2023-09-08 NOTE — PROGRESS NOTES
Transportation (Non-Medical): No   Physical Activity: Not on file   Stress: Not on file   Social Connections: Not on file   Intimate Partner Violence: Not on file   Housing Stability: Unknown (2/14/2023)    Housing Stability Vital Sign     Unable to Pay for Housing in the Last Year: Not on file     Number of Places Lived in the Last Year: Not on file     Unstable Housing in the Last Year: No         Family History   Problem Relation Age of Onset    Rheum Arthritis Mother     Hypertension Mother     Osteoarthritis Mother     Thyroid Disease Mother     Heart Disease Father         CAD    Stroke Father     Hypertension Father     Osteoarthritis Father     Heart Attack Father     Cancer Brother         lymphoma    Osteoarthritis Sister     Hypertension Sister     Breast Cancer Maternal Aunt 76    Diabetes Neg Hx     Thyroid Cancer Neg Hx          No Known Allergies      Current Outpatient Medications   Medication Sig    fluticasone (FLONASE) 50 MCG/ACT nasal spray 2 sprays by Nasal route daily 2 sprays in each nare qd    simvastatin (ZOCOR) 20 MG tablet Take 1 tablet by mouth nightly    Tirzepatide (MOUNJARO) 5 MG/0.5ML SOPN SC injection Inject 0.5 mLs into the skin once a week    Empagliflozin-metFORMIN HCl ER (SYNJARDY XR) 12.5-1000 MG TB24 Take 2 tablets by mouth daily    nebivolol (BYSTOLIC) 2.5 MG tablet Take 1 tablet by mouth daily    Metamucil Fiber CHEW Take by mouth 3 times daily    magnesium 30 MG tablet Take 1 tablet by mouth 2 times daily    albuterol sulfate HFA (PROVENTIL;VENTOLIN;PROAIR) 108 (90 Base) MCG/ACT inhaler Inhale 2 puffs into the lungs every 4 hours as needed    cetirizine (ZYRTEC) 10 MG tablet Take by mouth    olopatadine (PATADAY) 0.2 % SOLN ophthalmic solution Apply 1 drop to eye daily     No current facility-administered medications for this visit.            REVIEW OF SYSTEMS:   CONSTITUTIONAL:   There is no history of fever, chills, night sweats, weight loss, weight gain, persistent

## 2023-09-11 ENCOUNTER — OFFICE VISIT (OUTPATIENT)
Dept: SLEEP MEDICINE | Age: 63
End: 2023-09-11
Payer: COMMERCIAL

## 2023-09-11 VITALS
RESPIRATION RATE: 14 BRPM | BODY MASS INDEX: 28.89 KG/M2 | DIASTOLIC BLOOD PRESSURE: 64 MMHG | SYSTOLIC BLOOD PRESSURE: 110 MMHG | WEIGHT: 157 LBS | HEART RATE: 76 BPM | HEIGHT: 62 IN | OXYGEN SATURATION: 98 %

## 2023-09-11 DIAGNOSIS — G47.33 OSA (OBSTRUCTIVE SLEEP APNEA): Primary | ICD-10-CM

## 2023-09-11 PROCEDURE — 3074F SYST BP LT 130 MM HG: CPT | Performed by: NURSE PRACTITIONER

## 2023-09-11 PROCEDURE — 3078F DIAST BP <80 MM HG: CPT | Performed by: NURSE PRACTITIONER

## 2023-09-11 PROCEDURE — 99213 OFFICE O/P EST LOW 20 MIN: CPT | Performed by: NURSE PRACTITIONER

## 2023-09-11 ASSESSMENT — SLEEP AND FATIGUE QUESTIONNAIRES
HOW LIKELY ARE YOU TO NOD OFF OR FALL ASLEEP WHILE SITTING AND TALKING TO SOMEONE: 0
HOW LIKELY ARE YOU TO NOD OFF OR FALL ASLEEP IN A CAR, WHILE STOPPED FOR A FEW MINUTES IN TRAFFIC: 0
HOW LIKELY ARE YOU TO NOD OFF OR FALL ASLEEP WHILE SITTING INACTIVE IN A PUBLIC PLACE: 0
HOW LIKELY ARE YOU TO NOD OFF OR FALL ASLEEP WHEN YOU ARE A PASSENGER IN A CAR FOR AN HOUR WITHOUT A BREAK: 0
HOW LIKELY ARE YOU TO NOD OFF OR FALL ASLEEP WHILE SITTING AND READING: 0
ESS TOTAL SCORE: 1
HOW LIKELY ARE YOU TO NOD OFF OR FALL ASLEEP WHILE WATCHING TV: 0
HOW LIKELY ARE YOU TO NOD OFF OR FALL ASLEEP WHILE SITTING QUIETLY AFTER LUNCH WITHOUT ALCOHOL: 0
HOW LIKELY ARE YOU TO NOD OFF OR FALL ASLEEP WHILE LYING DOWN TO REST IN THE AFTERNOON WHEN CIRCUMSTANCES PERMIT: 1

## 2023-11-08 DIAGNOSIS — I10 ESSENTIAL HYPERTENSION, BENIGN: ICD-10-CM

## 2023-11-08 RX ORDER — NEBIVOLOL 2.5 MG/1
2.5 TABLET ORAL DAILY
Qty: 90 TABLET | Refills: 1 | Status: SHIPPED | OUTPATIENT
Start: 2023-11-08

## 2023-11-09 ENCOUNTER — APPOINTMENT (RX ONLY)
Dept: URBAN - METROPOLITAN AREA CLINIC 25 | Facility: CLINIC | Age: 63
Setting detail: DERMATOLOGY
End: 2023-11-09

## 2023-11-09 DIAGNOSIS — D485 NEOPLASM OF UNCERTAIN BEHAVIOR OF SKIN: ICD-10-CM

## 2023-11-09 DIAGNOSIS — D17 BENIGN LIPOMATOUS NEOPLASM: ICD-10-CM | Status: STABLE

## 2023-11-09 DIAGNOSIS — L72.11 PILAR CYST: ICD-10-CM

## 2023-11-09 PROBLEM — D48.5 NEOPLASM OF UNCERTAIN BEHAVIOR OF SKIN: Status: ACTIVE | Noted: 2023-11-09

## 2023-11-09 PROBLEM — D17.21 BENIGN LIPOMATOUS NEOPLASM OF SKIN AND SUBCUTANEOUS TISSUE OF RIGHT ARM: Status: ACTIVE | Noted: 2023-11-09

## 2023-11-09 PROCEDURE — ? OBSERVATION

## 2023-11-09 PROCEDURE — ? DEFER

## 2023-11-09 PROCEDURE — ? OTHER

## 2023-11-09 PROCEDURE — 99203 OFFICE O/P NEW LOW 30 MIN: CPT

## 2023-11-09 PROCEDURE — ? REFERRAL CORRESPONDENCE

## 2023-11-09 PROCEDURE — ? COUNSELING

## 2023-11-09 PROCEDURE — ? CONSULTATION FOR EXCISION

## 2023-11-09 PROCEDURE — ? ORDER ULTRASOUND

## 2023-11-09 ASSESSMENT — LOCATION DETAILED DESCRIPTION DERM
LOCATION DETAILED: RIGHT VENTRAL MEDIAL DISTAL FOREARM
LOCATION DETAILED: LEFT CENTRAL FRONTAL SCALP
LOCATION DETAILED: LEFT SUPERIOR FOREHEAD
LOCATION DETAILED: RIGHT LATERAL BUCCAL CHEEK

## 2023-11-09 ASSESSMENT — LOCATION SIMPLE DESCRIPTION DERM
LOCATION SIMPLE: RIGHT CHEEK
LOCATION SIMPLE: RIGHT FOREARM
LOCATION SIMPLE: LEFT SCALP
LOCATION SIMPLE: LEFT FOREHEAD

## 2023-11-09 ASSESSMENT — LOCATION ZONE DERM
LOCATION ZONE: ARM
LOCATION ZONE: FACE
LOCATION ZONE: SCALP

## 2023-11-09 NOTE — PROCEDURE: OTHER
Detail Level: Zone
Render Risk Assessment In Note?: no
Note Text (......Xxx Chief Complaint.): This diagnosis correlates with the
Other (Free Text): Discussed starting with ultrasound, but may need more in-depth imaging \\nCounseled that this is significantly altering the contour of her cheek, especially as she is loosing weight. Recommend second opinion from oral surgeon or ENT

## 2023-11-09 NOTE — HPI: SKIN LESION
What Type Of Note Output Would You Prefer (Optional)?: Bullet Format
How Severe Is Your Skin Lesion?: mild
Has Your Skin Lesion Been Treated?: not been treated
Is This A New Presentation, Or A Follow-Up?: Growth
Additional History: Pt states she had area evaluated by oral surgery but, he was not concerned.

## 2023-11-09 NOTE — PROCEDURE: DEFER
Detail Level: Detailed
Introduction Text (Please End With A Colon): The following procedure was deferred:
Size Of Lesion In Cm (Optional): 1.5
Procedure To Be Performed At Next Visit: Excision
X Size Of Lesion In Cm (Optional): 0

## 2023-11-09 NOTE — PROCEDURE: ORDER ULTRASOUND
Lesion Location: right inferior cheek at angle of jaw overlying masseter muscle
Priority: normal
Subcutaneous Lesion Ultrasound Reason: Growing mass/fluid collection
Provider: Shala Pabon DO
Ultrasound Protocol: Ultrasound of Subcutaneous Mass
Detail Level: Simple

## 2023-12-12 ENCOUNTER — APPOINTMENT (RX ONLY)
Dept: URBAN - METROPOLITAN AREA CLINIC 25 | Facility: CLINIC | Age: 63
Setting detail: DERMATOLOGY
End: 2023-12-12

## 2023-12-12 DIAGNOSIS — L72.8 OTHER FOLLICULAR CYSTS OF THE SKIN AND SUBCUTANEOUS TISSUE: ICD-10-CM

## 2023-12-12 PROCEDURE — ? EXCISION

## 2023-12-12 PROCEDURE — 11422 EXC H-F-NK-SP B9+MARG 1.1-2: CPT

## 2023-12-12 PROCEDURE — 12032 INTMD RPR S/A/T/EXT 2.6-7.5: CPT

## 2023-12-12 PROCEDURE — A4550 SURGICAL TRAYS: HCPCS

## 2023-12-12 ASSESSMENT — LOCATION SIMPLE DESCRIPTION DERM: LOCATION SIMPLE: LEFT SCALP

## 2023-12-12 ASSESSMENT — LOCATION DETAILED DESCRIPTION DERM: LOCATION DETAILED: LEFT CENTRAL FRONTAL SCALP

## 2023-12-12 ASSESSMENT — LOCATION ZONE DERM: LOCATION ZONE: SCALP

## 2023-12-12 NOTE — PROCEDURE: EXCISION

## 2023-12-28 ENCOUNTER — APPOINTMENT (RX ONLY)
Dept: URBAN - METROPOLITAN AREA CLINIC 25 | Facility: CLINIC | Age: 63
Setting detail: DERMATOLOGY
End: 2023-12-28

## 2023-12-28 DIAGNOSIS — Z48.01 ENCOUNTER FOR CHANGE OR REMOVAL OF SURGICAL WOUND DRESSING: ICD-10-CM

## 2023-12-28 PROCEDURE — ? SUTURE REMOVAL (GLOBAL PERIOD)

## 2023-12-28 PROCEDURE — 99024 POSTOP FOLLOW-UP VISIT: CPT

## 2023-12-28 ASSESSMENT — LOCATION DETAILED DESCRIPTION DERM: LOCATION DETAILED: LEFT CENTRAL FRONTAL SCALP

## 2023-12-28 ASSESSMENT — LOCATION SIMPLE DESCRIPTION DERM: LOCATION SIMPLE: LEFT SCALP

## 2023-12-28 ASSESSMENT — LOCATION ZONE DERM: LOCATION ZONE: SCALP

## 2023-12-28 NOTE — PROCEDURE: SUTURE REMOVAL (GLOBAL PERIOD)
Detail Level: Detailed
Add 69140 Cpt? (Important Note: In 2017 The Use Of 89133 Is Being Tracked By Cms To Determine Future Global Period Reimbursement For Global Periods): yes

## 2024-01-25 DIAGNOSIS — E11.9 CONTROLLED TYPE 2 DIABETES MELLITUS WITHOUT COMPLICATION, WITHOUT LONG-TERM CURRENT USE OF INSULIN (HCC): ICD-10-CM

## 2024-01-25 RX ORDER — TIRZEPATIDE 5 MG/.5ML
5 INJECTION, SOLUTION SUBCUTANEOUS WEEKLY
Qty: 4 ADJUSTABLE DOSE PRE-FILLED PEN SYRINGE | Refills: 2 | Status: SHIPPED | OUTPATIENT
Start: 2024-01-25

## 2024-01-25 NOTE — TELEPHONE ENCOUNTER
Let's resend and do the PA with this info.  I would recommend she stays on it until her follow up appointment and labs.

## 2024-01-25 NOTE — TELEPHONE ENCOUNTER
I called the patient and spoke to her about this. She stated that she follow the Whole 30 diet and does low sugar, and avoids cards. Sta stated that she walks 2 miles 3 times a week as well. She added in that she has about 1 more month left of being on it for a year and stated that she was going to talk about if she needed to stay on it at her visit in February 2024. If she does need to stay on it, she wants it sent to the [facility] that is in the pending medication.

## 2024-01-25 NOTE — TELEPHONE ENCOUNTER
Please call the patient and ask her if she is following a particular diet or exercise program.  Please place this in the telephone encounter and then I will re order the med and you can submit this with her PA.

## 2024-01-25 NOTE — TELEPHONE ENCOUNTER
Patient called and left a message about her Mounjaro needing a prior auth. I went to [employer] and submitted information and it came back denied. I called the number given to see why it was denied and they stated there was no documentation for the patient being on a diet and exercising.

## 2024-02-05 DIAGNOSIS — E11.9 CONTROLLED TYPE 2 DIABETES MELLITUS WITHOUT COMPLICATION, WITHOUT LONG-TERM CURRENT USE OF INSULIN (HCC): ICD-10-CM

## 2024-02-05 RX ORDER — EMPAGLIFLOZIN, METFORMIN HYDROCHLORIDE 12.5; 1 MG/1; MG/1
2 TABLET, EXTENDED RELEASE ORAL DAILY
Qty: 180 TABLET | Refills: 1 | Status: SHIPPED | OUTPATIENT
Start: 2024-02-05

## 2024-02-13 DIAGNOSIS — I10 ESSENTIAL HYPERTENSION, BENIGN: ICD-10-CM

## 2024-02-13 DIAGNOSIS — E11.9 CONTROLLED TYPE 2 DIABETES MELLITUS WITHOUT COMPLICATION, WITHOUT LONG-TERM CURRENT USE OF INSULIN (HCC): Primary | ICD-10-CM

## 2024-02-13 DIAGNOSIS — E78.2 MIXED HYPERLIPIDEMIA: ICD-10-CM

## 2024-02-14 DIAGNOSIS — E11.9 CONTROLLED TYPE 2 DIABETES MELLITUS WITHOUT COMPLICATION, WITHOUT LONG-TERM CURRENT USE OF INSULIN (HCC): ICD-10-CM

## 2024-02-14 DIAGNOSIS — I10 ESSENTIAL HYPERTENSION, BENIGN: ICD-10-CM

## 2024-02-14 DIAGNOSIS — E78.2 MIXED HYPERLIPIDEMIA: ICD-10-CM

## 2024-02-14 LAB
ALBUMIN SERPL-MCNC: 4.2 G/DL (ref 3.2–4.6)
ALBUMIN/GLOB SERPL: 1.3 (ref 0.4–1.6)
ALP SERPL-CCNC: 114 U/L (ref 50–136)
ALT SERPL-CCNC: 34 U/L (ref 12–65)
ANION GAP SERPL CALC-SCNC: 6 MMOL/L (ref 2–11)
AST SERPL-CCNC: 17 U/L (ref 15–37)
BILIRUB SERPL-MCNC: 0.5 MG/DL (ref 0.2–1.1)
BUN SERPL-MCNC: 20 MG/DL (ref 8–23)
CALCIUM SERPL-MCNC: 10.4 MG/DL (ref 8.3–10.4)
CHLORIDE SERPL-SCNC: 108 MMOL/L (ref 103–113)
CHOLEST SERPL-MCNC: 105 MG/DL
CO2 SERPL-SCNC: 27 MMOL/L (ref 21–32)
CREAT SERPL-MCNC: 1 MG/DL (ref 0.6–1)
GLOBULIN SER CALC-MCNC: 3.3 G/DL (ref 2.8–4.5)
GLUCOSE SERPL-MCNC: 119 MG/DL (ref 65–100)
HDLC SERPL-MCNC: 37 MG/DL (ref 40–60)
HDLC SERPL: 2.8
LDLC SERPL CALC-MCNC: 53.4 MG/DL
POTASSIUM SERPL-SCNC: 4.7 MMOL/L (ref 3.5–5.1)
PROT SERPL-MCNC: 7.5 G/DL (ref 6.3–8.2)
SODIUM SERPL-SCNC: 141 MMOL/L (ref 136–146)
TRIGL SERPL-MCNC: 73 MG/DL (ref 35–150)
VLDLC SERPL CALC-MCNC: 14.6 MG/DL (ref 6–23)

## 2024-02-15 LAB
EST. AVERAGE GLUCOSE BLD GHB EST-MCNC: 114 MG/DL
HBA1C MFR BLD: 5.6 % (ref 4.8–5.6)

## 2024-02-22 ENCOUNTER — OFFICE VISIT (OUTPATIENT)
Dept: FAMILY MEDICINE CLINIC | Facility: CLINIC | Age: 64
End: 2024-02-22
Payer: COMMERCIAL

## 2024-02-22 VITALS
SYSTOLIC BLOOD PRESSURE: 109 MMHG | HEART RATE: 75 BPM | DIASTOLIC BLOOD PRESSURE: 65 MMHG | HEIGHT: 62 IN | RESPIRATION RATE: 19 BRPM | WEIGHT: 156 LBS | BODY MASS INDEX: 28.71 KG/M2 | OXYGEN SATURATION: 97 %

## 2024-02-22 DIAGNOSIS — L98.9 SKIN LESION: Primary | ICD-10-CM

## 2024-02-22 DIAGNOSIS — E11.9 CONTROLLED TYPE 2 DIABETES MELLITUS WITHOUT COMPLICATION, WITHOUT LONG-TERM CURRENT USE OF INSULIN (HCC): ICD-10-CM

## 2024-02-22 DIAGNOSIS — E78.2 MIXED HYPERLIPIDEMIA: ICD-10-CM

## 2024-02-22 PROCEDURE — 3078F DIAST BP <80 MM HG: CPT | Performed by: FAMILY MEDICINE

## 2024-02-22 PROCEDURE — 3074F SYST BP LT 130 MM HG: CPT | Performed by: FAMILY MEDICINE

## 2024-02-22 PROCEDURE — 99214 OFFICE O/P EST MOD 30 MIN: CPT | Performed by: FAMILY MEDICINE

## 2024-02-22 PROCEDURE — 3044F HG A1C LEVEL LT 7.0%: CPT | Performed by: FAMILY MEDICINE

## 2024-02-22 RX ORDER — TIRZEPATIDE 2.5 MG/.5ML
2.5 INJECTION, SOLUTION SUBCUTANEOUS WEEKLY
Qty: 4 EACH | Refills: 2 | Status: SHIPPED | OUTPATIENT
Start: 2024-02-22

## 2024-02-22 RX ORDER — SIMVASTATIN 20 MG
20 TABLET ORAL NIGHTLY
Qty: 90 TABLET | Refills: 1 | Status: SHIPPED | OUTPATIENT
Start: 2024-02-22

## 2024-02-22 RX ORDER — BETAMETHASONE DIPROPIONATE 0.05 %
OINTMENT (GRAM) TOPICAL
Qty: 45 G | Refills: 1 | Status: SHIPPED | OUTPATIENT
Start: 2024-02-22

## 2024-02-22 ASSESSMENT — PATIENT HEALTH QUESTIONNAIRE - PHQ9
SUM OF ALL RESPONSES TO PHQ QUESTIONS 1-9: 0
SUM OF ALL RESPONSES TO PHQ QUESTIONS 1-9: 0
2. FEELING DOWN, DEPRESSED OR HOPELESS: 0
SUM OF ALL RESPONSES TO PHQ QUESTIONS 1-9: 0
SUM OF ALL RESPONSES TO PHQ QUESTIONS 1-9: 0
SUM OF ALL RESPONSES TO PHQ9 QUESTIONS 1 & 2: 0
1. LITTLE INTEREST OR PLEASURE IN DOING THINGS: 0

## 2024-02-22 ASSESSMENT — ENCOUNTER SYMPTOMS
VOMITING: 0
DIARRHEA: 0
SHORTNESS OF BREATH: 0
CONSTIPATION: 0
COUGH: 0

## 2024-02-22 NOTE — PROGRESS NOTES
Vika Crook (:  1960) is a 63 y.o. female,Established patient, here for evaluation of the following chief complaint(s):  6 Month Follow-Up (On Lipids and Diabetes. Had blood work prior to the visit.), Skin Problem (Has a red spot on left upper arm for the past 2 weeks.), and Other (Has been over a year since the last Diabetic foot exam. Last eye exam was in Oct 2023. Has been over 10 years since the last Tdap and Pneumonia vac.)         ASSESSMENT/PLAN:  1. Skin lesion  -     betamethasone dipropionate 0.05 % ointment; Apply topically daily., Disp-45 g, R-1, Normal  2. Mixed hyperlipidemia  -     simvastatin (ZOCOR) 20 MG tablet; Take 1 tablet by mouth nightly, Disp-90 tablet, R-1Normal  3. Controlled type 2 diabetes mellitus without complication, without long-term current use of insulin (HCC)  Steroid for possible psoriasis lesion, but if not improving, make appt w/ me or derm for biopsy to r/o skin cancer.   Decreased mounjaro to 2.5mg.   C/w current zocor dose.     Return in about 6 months (around 2024).         Subjective   SUBJECTIVE/OBJECTIVE:  HPI  Chief Complaint   Patient presents with    6 Month Follow-Up     On Lipids and Diabetes. Had blood work prior to the visit.    Skin Problem     Has a red spot on left upper arm for the past 2 weeks.    Other     Has been over a year since the last Diabetic foot exam. Last eye exam was in Oct 2023. Has been over 10 years since the last Tdap and Pneumonia vac.     F/U  Dyslipidemia -  Pt states compliant w/ regimen w/o medication adverse effects.   F/U DM - blood sugars well controlled per pt. Compliant w/ meds w/o adverse effects. Denies s/s uncontrolled DM or of target organ damage. She is on mounjaro 5mg and doing well. Has lost 24 pounds in the last two years.     Skin issue on her left upper arm--red, keeps getting scaly and flaky.  Does have psoriasis.     She has an appt w/ plastic surgery for facial lipoma.     Review of Systems

## 2024-05-13 DIAGNOSIS — I10 ESSENTIAL HYPERTENSION, BENIGN: ICD-10-CM

## 2024-05-13 DIAGNOSIS — J30.1 ALLERGIC RHINITIS DUE TO POLLEN, UNSPECIFIED SEASONALITY: ICD-10-CM

## 2024-05-14 RX ORDER — FLUTICASONE PROPIONATE 50 MCG
2 SPRAY, SUSPENSION (ML) NASAL DAILY
Qty: 16 G | Refills: 2 | Status: SHIPPED | OUTPATIENT
Start: 2024-05-14

## 2024-05-14 RX ORDER — NEBIVOLOL 2.5 MG/1
2.5 TABLET ORAL DAILY
Qty: 90 TABLET | Refills: 1 | Status: SHIPPED | OUTPATIENT
Start: 2024-05-14

## 2024-05-23 RX ORDER — TIRZEPATIDE 2.5 MG/.5ML
2.5 INJECTION, SOLUTION SUBCUTANEOUS WEEKLY
Qty: 4 EACH | Refills: 2 | Status: SHIPPED | OUTPATIENT
Start: 2024-05-23

## 2024-05-23 NOTE — TELEPHONE ENCOUNTER
Last visit was on 2-22-24, and next visit is on 8-22-24.  Patient comment: I am out of refills? Do I continue?

## 2024-06-11 ENCOUNTER — HOSPITAL ENCOUNTER (OUTPATIENT)
Dept: MAMMOGRAPHY | Age: 64
Discharge: HOME | End: 2024-06-14
Attending: FAMILY MEDICINE
Payer: COMMERCIAL

## 2024-06-11 DIAGNOSIS — Z12.31 VISIT FOR SCREENING MAMMOGRAM: ICD-10-CM

## 2024-06-11 PROCEDURE — 77063 BREAST TOMOSYNTHESIS BI: CPT

## 2024-08-05 DIAGNOSIS — E11.9 CONTROLLED TYPE 2 DIABETES MELLITUS WITHOUT COMPLICATION, WITHOUT LONG-TERM CURRENT USE OF INSULIN (HCC): ICD-10-CM

## 2024-08-05 DIAGNOSIS — I10 ESSENTIAL HYPERTENSION, BENIGN: ICD-10-CM

## 2024-08-05 RX ORDER — NEBIVOLOL 2.5 MG/1
2.5 TABLET ORAL DAILY
Qty: 90 TABLET | Refills: 1 | Status: CANCELLED | OUTPATIENT
Start: 2024-08-05

## 2024-08-06 RX ORDER — EMPAGLIFLOZIN, METFORMIN HYDROCHLORIDE 12.5; 1 MG/1; MG/1
2 TABLET, EXTENDED RELEASE ORAL DAILY
Qty: 180 TABLET | Refills: 0 | Status: SHIPPED | OUTPATIENT
Start: 2024-08-06

## 2024-08-15 DIAGNOSIS — E78.2 MIXED HYPERLIPIDEMIA: ICD-10-CM

## 2024-08-15 DIAGNOSIS — I10 ESSENTIAL HYPERTENSION, BENIGN: ICD-10-CM

## 2024-08-15 DIAGNOSIS — E11.9 CONTROLLED TYPE 2 DIABETES MELLITUS WITHOUT COMPLICATION, WITHOUT LONG-TERM CURRENT USE OF INSULIN (HCC): Primary | ICD-10-CM

## 2024-08-15 LAB
ALBUMIN SERPL-MCNC: 4.3 G/DL (ref 3.2–4.6)
ALBUMIN/GLOB SERPL: 1.7 (ref 1–1.9)
ALP SERPL-CCNC: 89 U/L (ref 35–104)
ALT SERPL-CCNC: 23 U/L (ref 12–65)
ANION GAP SERPL CALC-SCNC: 10 MMOL/L (ref 9–18)
AST SERPL-CCNC: 21 U/L (ref 15–37)
BILIRUB SERPL-MCNC: 0.5 MG/DL (ref 0–1.2)
BUN SERPL-MCNC: 21 MG/DL (ref 8–23)
CALCIUM SERPL-MCNC: 10.1 MG/DL (ref 8.8–10.2)
CHLORIDE SERPL-SCNC: 105 MMOL/L (ref 98–107)
CHOLEST SERPL-MCNC: 119 MG/DL (ref 0–200)
CO2 SERPL-SCNC: 28 MMOL/L (ref 20–28)
CREAT SERPL-MCNC: 0.89 MG/DL (ref 0.6–1.1)
EST. AVERAGE GLUCOSE BLD GHB EST-MCNC: 128 MG/DL
GLOBULIN SER CALC-MCNC: 2.5 G/DL (ref 2.3–3.5)
GLUCOSE SERPL-MCNC: 130 MG/DL (ref 70–99)
HBA1C MFR BLD: 6.1 % (ref 0–5.6)
HDLC SERPL-MCNC: 42 MG/DL (ref 40–60)
HDLC SERPL: 2.8 (ref 0–5)
LDLC SERPL CALC-MCNC: 61 MG/DL (ref 0–100)
POTASSIUM SERPL-SCNC: 4.7 MMOL/L (ref 3.5–5.1)
PROT SERPL-MCNC: 6.8 G/DL (ref 6.3–8.2)
SODIUM SERPL-SCNC: 143 MMOL/L (ref 136–145)
TRIGL SERPL-MCNC: 81 MG/DL (ref 0–150)
VLDLC SERPL CALC-MCNC: 16 MG/DL (ref 6–23)

## 2024-08-20 SDOH — ECONOMIC STABILITY: INCOME INSECURITY: HOW HARD IS IT FOR YOU TO PAY FOR THE VERY BASICS LIKE FOOD, HOUSING, MEDICAL CARE, AND HEATING?: NOT HARD AT ALL

## 2024-08-20 SDOH — ECONOMIC STABILITY: FOOD INSECURITY: WITHIN THE PAST 12 MONTHS, THE FOOD YOU BOUGHT JUST DIDN'T LAST AND YOU DIDN'T HAVE MONEY TO GET MORE.: NEVER TRUE

## 2024-08-20 SDOH — ECONOMIC STABILITY: FOOD INSECURITY: WITHIN THE PAST 12 MONTHS, YOU WORRIED THAT YOUR FOOD WOULD RUN OUT BEFORE YOU GOT MONEY TO BUY MORE.: NEVER TRUE

## 2024-08-22 ENCOUNTER — OFFICE VISIT (OUTPATIENT)
Dept: FAMILY MEDICINE CLINIC | Facility: CLINIC | Age: 64
End: 2024-08-22
Payer: COMMERCIAL

## 2024-08-22 VITALS
SYSTOLIC BLOOD PRESSURE: 119 MMHG | RESPIRATION RATE: 19 BRPM | HEART RATE: 67 BPM | BODY MASS INDEX: 30.73 KG/M2 | HEIGHT: 62 IN | WEIGHT: 167 LBS | DIASTOLIC BLOOD PRESSURE: 68 MMHG | OXYGEN SATURATION: 99 % | TEMPERATURE: 97.1 F

## 2024-08-22 DIAGNOSIS — J30.1 ALLERGIC RHINITIS DUE TO POLLEN, UNSPECIFIED SEASONALITY: ICD-10-CM

## 2024-08-22 DIAGNOSIS — E11.9 CONTROLLED TYPE 2 DIABETES MELLITUS WITHOUT COMPLICATION, WITHOUT LONG-TERM CURRENT USE OF INSULIN (HCC): Primary | ICD-10-CM

## 2024-08-22 DIAGNOSIS — E78.2 MIXED HYPERLIPIDEMIA: ICD-10-CM

## 2024-08-22 PROCEDURE — 99214 OFFICE O/P EST MOD 30 MIN: CPT | Performed by: FAMILY MEDICINE

## 2024-08-22 PROCEDURE — 3074F SYST BP LT 130 MM HG: CPT | Performed by: FAMILY MEDICINE

## 2024-08-22 PROCEDURE — 3078F DIAST BP <80 MM HG: CPT | Performed by: FAMILY MEDICINE

## 2024-08-22 PROCEDURE — 3044F HG A1C LEVEL LT 7.0%: CPT | Performed by: FAMILY MEDICINE

## 2024-08-22 RX ORDER — TIRZEPATIDE 5 MG/.5ML
5 INJECTION, SOLUTION SUBCUTANEOUS WEEKLY
Qty: 4 ADJUSTABLE DOSE PRE-FILLED PEN SYRINGE | Refills: 5 | Status: SHIPPED | OUTPATIENT
Start: 2024-08-22

## 2024-08-22 RX ORDER — SIMVASTATIN 20 MG
20 TABLET ORAL NIGHTLY
Qty: 90 TABLET | Refills: 1 | Status: SHIPPED | OUTPATIENT
Start: 2024-08-22

## 2024-08-22 RX ORDER — FLUTICASONE PROPIONATE 50 MCG
2 SPRAY, SUSPENSION (ML) NASAL DAILY
Qty: 16 G | Refills: 5 | Status: SHIPPED | OUTPATIENT
Start: 2024-08-22

## 2024-08-22 RX ORDER — TIRZEPATIDE 2.5 MG/.5ML
2.5 INJECTION, SOLUTION SUBCUTANEOUS WEEKLY
Qty: 4 EACH | Refills: 2 | Status: CANCELLED | OUTPATIENT
Start: 2024-08-22

## 2024-08-22 ASSESSMENT — ENCOUNTER SYMPTOMS
CONSTIPATION: 0
VOMITING: 0
COUGH: 0
DIARRHEA: 0
SHORTNESS OF BREATH: 0

## 2024-08-22 NOTE — PROGRESS NOTES
Vika Crook (:  1960) is a 63 y.o. female,Established patient, here for evaluation of the following chief complaint(s):  6 Month Follow-Up (On Diabetes and Lipids. Had blood work prior to the visit.) and Other (Has been year a year since the last Diabetic foot exam. Had an eye exam in Sep. 2023. Has been year 10 years since the last Tdap and Pneumonia vac.)      Assessment & Plan   ASSESSMENT/PLAN:  1. Controlled type 2 diabetes mellitus without complication, without long-term current use of insulin (HCC)  -     Tirzepatide (MOUNJARO) 5 MG/0.5ML SOPN SC injection; Inject 0.5 mLs into the skin once a week, Disp-4 Adjustable Dose Pre-filled Pen Syringe, R-5Normal  2. Allergic rhinitis due to pollen, unspecified seasonality  -     fluticasone (FLONASE) 50 MCG/ACT nasal spray; 2 sprays by Nasal route daily 2 sprays in each nare qd, Disp-16 g, R-5Normal  3. Mixed hyperlipidemia  -     simvastatin (ZOCOR) 20 MG tablet; Take 1 tablet by mouth nightly, Disp-90 tablet, R-1Normal  Increase mounjaro to 5mg again.     No follow-ups on file.         Subjective   SUBJECTIVE/OBJECTIVE:  HPI  Chief Complaint   Patient presents with    6 Month Follow-Up     On Diabetes and Lipids. Had blood work prior to the visit.    Other     Has been year a year since the last Diabetic foot exam. Had an eye exam in Sep. 2023. Has been year 10 years since the last Tdap and Pneumonia vac.     F/U  Dyslipidemia -  Pt states compliant w/ regimen w/o medication adverse effects.   F/U DM - blood sugars well controlled per pt. Compliant w/ meds w/o adverse effects. Denies s/s uncontrolled DM or of target organ damage. She is on mounjaro 2.5mg and doing well. Has lost some weight and then regained about 11 pounds due to increase in carbs.  She plans to cut down on grains and high fodmap foods to help with this.     She has an appt w/ plastic surgery for facial lipoma. Awaiting an MRI. Seeing [facility] plastic surgery.      Review of Systems

## 2024-09-11 ENCOUNTER — OFFICE VISIT (OUTPATIENT)
Dept: SLEEP MEDICINE | Age: 64
End: 2024-09-11
Payer: COMMERCIAL

## 2024-09-11 VITALS
BODY MASS INDEX: 30.55 KG/M2 | WEIGHT: 166 LBS | RESPIRATION RATE: 17 BRPM | SYSTOLIC BLOOD PRESSURE: 120 MMHG | DIASTOLIC BLOOD PRESSURE: 76 MMHG | OXYGEN SATURATION: 96 % | HEART RATE: 80 BPM | HEIGHT: 62 IN

## 2024-09-11 DIAGNOSIS — G47.33 OSA (OBSTRUCTIVE SLEEP APNEA): Primary | ICD-10-CM

## 2024-09-11 PROCEDURE — G2211 COMPLEX E/M VISIT ADD ON: HCPCS | Performed by: NURSE PRACTITIONER

## 2024-09-11 PROCEDURE — 99213 OFFICE O/P EST LOW 20 MIN: CPT | Performed by: NURSE PRACTITIONER

## 2024-09-11 PROCEDURE — 3078F DIAST BP <80 MM HG: CPT | Performed by: NURSE PRACTITIONER

## 2024-09-11 PROCEDURE — 3074F SYST BP LT 130 MM HG: CPT | Performed by: NURSE PRACTITIONER

## 2024-09-11 ASSESSMENT — SLEEP AND FATIGUE QUESTIONNAIRES
HOW LIKELY ARE YOU TO NOD OFF OR FALL ASLEEP WHILE LYING DOWN TO REST IN THE AFTERNOON WHEN CIRCUMSTANCES PERMIT: SLIGHT CHANCE OF DOZING
ESS TOTAL SCORE: 1
HOW LIKELY ARE YOU TO NOD OFF OR FALL ASLEEP WHILE SITTING QUIETLY AFTER LUNCH WITHOUT ALCOHOL: WOULD NEVER DOZE
HOW LIKELY ARE YOU TO NOD OFF OR FALL ASLEEP WHILE SITTING AND TALKING TO SOMEONE: WOULD NEVER DOZE
HOW LIKELY ARE YOU TO NOD OFF OR FALL ASLEEP IN A CAR, WHILE STOPPED FOR A FEW MINUTES IN TRAFFIC: WOULD NEVER DOZE
HOW LIKELY ARE YOU TO NOD OFF OR FALL ASLEEP WHILE WATCHING TV: WOULD NEVER DOZE
HOW LIKELY ARE YOU TO NOD OFF OR FALL ASLEEP WHEN YOU ARE A PASSENGER IN A CAR FOR AN HOUR WITHOUT A BREAK: WOULD NEVER DOZE
HOW LIKELY ARE YOU TO NOD OFF OR FALL ASLEEP WHILE SITTING INACTIVE IN A PUBLIC PLACE: WOULD NEVER DOZE
HOW LIKELY ARE YOU TO NOD OFF OR FALL ASLEEP WHILE SITTING AND READING: WOULD NEVER DOZE

## 2024-11-07 DIAGNOSIS — E11.9 CONTROLLED TYPE 2 DIABETES MELLITUS WITHOUT COMPLICATION, WITHOUT LONG-TERM CURRENT USE OF INSULIN (HCC): ICD-10-CM

## 2024-11-07 DIAGNOSIS — I10 ESSENTIAL HYPERTENSION, BENIGN: ICD-10-CM

## 2024-11-07 RX ORDER — NEBIVOLOL 2.5 MG/1
2.5 TABLET ORAL DAILY
Qty: 30 TABLET | Refills: 5 | Status: SHIPPED | OUTPATIENT
Start: 2024-11-07

## 2024-11-07 RX ORDER — EMPAGLIFLOZIN, METFORMIN HYDROCHLORIDE 12.5; 1 MG/1; MG/1
2 TABLET, EXTENDED RELEASE ORAL DAILY
Qty: 60 TABLET | Refills: 5 | Status: SHIPPED | OUTPATIENT
Start: 2024-11-07

## 2025-01-31 RX ORDER — TIRZEPATIDE 5 MG/.5ML
0.5 INJECTION, SOLUTION SUBCUTANEOUS WEEKLY
Qty: 8 ML | Refills: 0 | Status: SHIPPED | OUTPATIENT
Start: 2025-01-31

## 2025-01-31 NOTE — TELEPHONE ENCOUNTER
Patient called in regards to prescription refill -   Tirzepatide (MOUNJARO) 5 MG/0.5ML SOPN SC injection     Patient stated she takes her last injection on this sunday 02/02/2025.    Patient stated that she will not have any of this for the month of Feb after this last injection and doesnt remember Dr. Tai stating if they were stopping this medication.     Patient is wanting to know if Dr. Tai can call a prescription in if she wants her to continue this medication as she does not see NP Annika until 02/26/2025.    -Please Advise

## 2025-02-05 RX ORDER — TIRZEPATIDE 5 MG/.5ML
0.5 INJECTION, SOLUTION SUBCUTANEOUS WEEKLY
Qty: 8 ML | Refills: 0 | OUTPATIENT
Start: 2025-02-05

## 2025-02-25 SDOH — HEALTH STABILITY: PHYSICAL HEALTH: ON AVERAGE, HOW MANY MINUTES DO YOU ENGAGE IN EXERCISE AT THIS LEVEL?: 40 MIN

## 2025-02-25 SDOH — HEALTH STABILITY: PHYSICAL HEALTH: ON AVERAGE, HOW MANY DAYS PER WEEK DO YOU ENGAGE IN MODERATE TO STRENUOUS EXERCISE (LIKE A BRISK WALK)?: 3 DAYS

## 2025-02-26 ENCOUNTER — OFFICE VISIT (OUTPATIENT)
Dept: FAMILY MEDICINE CLINIC | Facility: CLINIC | Age: 65
End: 2025-02-26
Payer: COMMERCIAL

## 2025-02-26 VITALS
WEIGHT: 160 LBS | HEART RATE: 85 BPM | OXYGEN SATURATION: 95 % | HEIGHT: 62 IN | SYSTOLIC BLOOD PRESSURE: 114 MMHG | DIASTOLIC BLOOD PRESSURE: 71 MMHG | RESPIRATION RATE: 18 BRPM | TEMPERATURE: 96.7 F | BODY MASS INDEX: 29.44 KG/M2

## 2025-02-26 DIAGNOSIS — E78.2 MIXED HYPERLIPIDEMIA: ICD-10-CM

## 2025-02-26 DIAGNOSIS — I10 ESSENTIAL HYPERTENSION, BENIGN: ICD-10-CM

## 2025-02-26 DIAGNOSIS — L98.9 SKIN LESION: ICD-10-CM

## 2025-02-26 DIAGNOSIS — J30.1 ALLERGIC RHINITIS DUE TO POLLEN, UNSPECIFIED SEASONALITY: ICD-10-CM

## 2025-02-26 DIAGNOSIS — E11.9 CONTROLLED TYPE 2 DIABETES MELLITUS WITHOUT COMPLICATION, WITHOUT LONG-TERM CURRENT USE OF INSULIN (HCC): Primary | ICD-10-CM

## 2025-02-26 PROCEDURE — 3078F DIAST BP <80 MM HG: CPT

## 2025-02-26 PROCEDURE — 3074F SYST BP LT 130 MM HG: CPT

## 2025-02-26 PROCEDURE — 99214 OFFICE O/P EST MOD 30 MIN: CPT

## 2025-02-26 RX ORDER — SIMVASTATIN 20 MG
20 TABLET ORAL NIGHTLY
Qty: 90 TABLET | Refills: 1 | Status: SHIPPED | OUTPATIENT
Start: 2025-02-26

## 2025-02-26 RX ORDER — TIRZEPATIDE 5 MG/.5ML
0.5 INJECTION, SOLUTION SUBCUTANEOUS WEEKLY
Qty: 8 ML | Refills: 0 | Status: SHIPPED | OUTPATIENT
Start: 2025-02-26

## 2025-02-26 SDOH — ECONOMIC STABILITY: FOOD INSECURITY: WITHIN THE PAST 12 MONTHS, THE FOOD YOU BOUGHT JUST DIDN'T LAST AND YOU DIDN'T HAVE MONEY TO GET MORE.: NEVER TRUE

## 2025-02-26 SDOH — ECONOMIC STABILITY: FOOD INSECURITY: WITHIN THE PAST 12 MONTHS, YOU WORRIED THAT YOUR FOOD WOULD RUN OUT BEFORE YOU GOT MONEY TO BUY MORE.: NEVER TRUE

## 2025-02-26 ASSESSMENT — ENCOUNTER SYMPTOMS
COUGH: 0
SHORTNESS OF BREATH: 0
BACK PAIN: 0
WHEEZING: 0
ABDOMINAL PAIN: 0
BLOOD IN STOOL: 0
DIARRHEA: 0
CHEST TIGHTNESS: 0
PHOTOPHOBIA: 0
CONSTIPATION: 1
TROUBLE SWALLOWING: 0

## 2025-02-26 ASSESSMENT — PATIENT HEALTH QUESTIONNAIRE - PHQ9
2. FEELING DOWN, DEPRESSED OR HOPELESS: NOT AT ALL
SUM OF ALL RESPONSES TO PHQ QUESTIONS 1-9: 0
SUM OF ALL RESPONSES TO PHQ9 QUESTIONS 1 & 2: 0
1. LITTLE INTEREST OR PLEASURE IN DOING THINGS: NOT AT ALL

## 2025-02-26 NOTE — PROGRESS NOTES
1.575 m (5' 2\")   Wt 72.6 kg (160 lb)   SpO2 95%   BMI 29.26 kg/m²           An electronic signature was used to authenticate this note.    --ROMAN Frausto - NP

## 2025-02-26 NOTE — ASSESSMENT & PLAN NOTE
Chronic, at goal (stable), continue current treatment plan and lifestyle modifications recommended    Orders:    Comprehensive Metabolic Panel; Future    CBC with Auto Differential; Future

## 2025-02-26 NOTE — ASSESSMENT & PLAN NOTE
Chronic, at goal (stable), continue current treatment plan and lifestyle modifications recommended    Orders:    simvastatin (ZOCOR) 20 MG tablet; Take 1 tablet by mouth nightly    Lipid Panel; Future

## 2025-02-26 NOTE — ASSESSMENT & PLAN NOTE
Chronic, at goal (stable), continue current treatment plan and lifestyle modifications recommended    Orders:    Tirzepatide (MOUNJARO) 5 MG/0.5ML SOAJ; Inject 0.5 mLs into the skin once a week    Hemoglobin A1C; Future    TSH reflex to FT4; Future    Comprehensive Metabolic Panel; Future    CBC with Auto Differential; Future    Albumin/Creatinine Ratio, Urine; Future

## 2025-03-05 ENCOUNTER — LAB (OUTPATIENT)
Dept: FAMILY MEDICINE CLINIC | Facility: CLINIC | Age: 65
End: 2025-03-05

## 2025-03-05 DIAGNOSIS — E11.9 CONTROLLED TYPE 2 DIABETES MELLITUS WITHOUT COMPLICATION, WITHOUT LONG-TERM CURRENT USE OF INSULIN (HCC): Primary | ICD-10-CM

## 2025-03-05 DIAGNOSIS — I10 ESSENTIAL HYPERTENSION, BENIGN: ICD-10-CM

## 2025-03-05 DIAGNOSIS — E11.9 CONTROLLED TYPE 2 DIABETES MELLITUS WITHOUT COMPLICATION, WITHOUT LONG-TERM CURRENT USE OF INSULIN (HCC): ICD-10-CM

## 2025-03-05 DIAGNOSIS — E78.2 MIXED HYPERLIPIDEMIA: ICD-10-CM

## 2025-03-05 LAB
ALBUMIN SERPL-MCNC: 4 G/DL (ref 3.2–4.6)
ALBUMIN/GLOB SERPL: 1.5 (ref 1–1.9)
ALP SERPL-CCNC: 82 U/L (ref 35–104)
ALT SERPL-CCNC: 27 U/L (ref 8–45)
ANION GAP SERPL CALC-SCNC: 10 MMOL/L (ref 7–16)
AST SERPL-CCNC: 22 U/L (ref 15–37)
BASOPHILS # BLD: 0.04 K/UL (ref 0–0.2)
BASOPHILS NFR BLD: 0.5 % (ref 0–2)
BILIRUB SERPL-MCNC: 0.4 MG/DL (ref 0–1.2)
BUN SERPL-MCNC: 18 MG/DL (ref 8–23)
CALCIUM SERPL-MCNC: 10.2 MG/DL (ref 8.8–10.2)
CHLORIDE SERPL-SCNC: 105 MMOL/L (ref 98–107)
CHOLEST SERPL-MCNC: 106 MG/DL (ref 0–200)
CO2 SERPL-SCNC: 26 MMOL/L (ref 20–29)
CREAT SERPL-MCNC: 1.03 MG/DL (ref 0.6–1.1)
CREAT UR-MCNC: 68.1 MG/DL (ref 28–217)
DIFFERENTIAL METHOD BLD: ABNORMAL
EOSINOPHIL # BLD: 0.14 K/UL (ref 0–0.8)
EOSINOPHIL NFR BLD: 1.8 % (ref 0.5–7.8)
ERYTHROCYTE [DISTWIDTH] IN BLOOD BY AUTOMATED COUNT: 12.1 % (ref 11.9–14.6)
EST. AVERAGE GLUCOSE BLD GHB EST-MCNC: 120 MG/DL
GLOBULIN SER CALC-MCNC: 2.8 G/DL (ref 2.3–3.5)
GLUCOSE SERPL-MCNC: 101 MG/DL (ref 70–99)
HBA1C MFR BLD: 5.8 % (ref 0–5.6)
HCT VFR BLD AUTO: 47 % (ref 35.8–46.3)
HDLC SERPL-MCNC: 35 MG/DL (ref 40–60)
HDLC SERPL: 3.1 (ref 0–5)
HGB BLD-MCNC: 15.3 G/DL (ref 11.7–15.4)
IMM GRANULOCYTES # BLD AUTO: 0.04 K/UL (ref 0–0.5)
IMM GRANULOCYTES NFR BLD AUTO: 0.5 % (ref 0–5)
LDLC SERPL CALC-MCNC: 53 MG/DL (ref 0–100)
LYMPHOCYTES # BLD: 2.37 K/UL (ref 0.5–4.6)
LYMPHOCYTES NFR BLD: 29.6 % (ref 13–44)
MCH RBC QN AUTO: 30.8 PG (ref 26.1–32.9)
MCHC RBC AUTO-ENTMCNC: 32.6 G/DL (ref 31.4–35)
MCV RBC AUTO: 94.6 FL (ref 82–102)
MICROALBUMIN UR-MCNC: <1.2 MG/DL (ref 0–20)
MICROALBUMIN/CREAT UR-RTO: NORMAL MG/G (ref 0–30)
MONOCYTES # BLD: 0.69 K/UL (ref 0.1–1.3)
MONOCYTES NFR BLD: 8.6 % (ref 4–12)
NEUTS SEG # BLD: 4.72 K/UL (ref 1.7–8.2)
NEUTS SEG NFR BLD: 59 % (ref 43–78)
NRBC # BLD: 0 K/UL (ref 0–0.2)
PLATELET # BLD AUTO: 273 K/UL (ref 150–450)
PMV BLD AUTO: 10.2 FL (ref 9.4–12.3)
POTASSIUM SERPL-SCNC: 5.1 MMOL/L (ref 3.5–5.1)
PROT SERPL-MCNC: 6.8 G/DL (ref 6.3–8.2)
RBC # BLD AUTO: 4.97 M/UL (ref 4.05–5.2)
SODIUM SERPL-SCNC: 141 MMOL/L (ref 136–145)
TRIGL SERPL-MCNC: 90 MG/DL (ref 0–150)
TSH W FREE THYROID IF ABNORMAL: 1.1 UIU/ML (ref 0.27–4.2)
VLDLC SERPL CALC-MCNC: 18 MG/DL (ref 6–23)
WBC # BLD AUTO: 8 K/UL (ref 4.3–11.1)

## 2025-03-05 NOTE — RESULT ENCOUNTER NOTE
Please notify Vika that labs are stable and HgbA1c has improved- currently 5.8%. No change to medication regimen.  Thanks,  Annika

## 2025-05-07 DIAGNOSIS — I10 ESSENTIAL HYPERTENSION, BENIGN: ICD-10-CM

## 2025-05-07 DIAGNOSIS — E11.9 CONTROLLED TYPE 2 DIABETES MELLITUS WITHOUT COMPLICATION, WITHOUT LONG-TERM CURRENT USE OF INSULIN (HCC): ICD-10-CM

## 2025-05-07 RX ORDER — NEBIVOLOL 2.5 MG/1
2.5 TABLET ORAL DAILY
Qty: 30 TABLET | Refills: 5 | Status: SHIPPED | OUTPATIENT
Start: 2025-05-07

## 2025-05-07 RX ORDER — EMPAGLIFLOZIN, METFORMIN HYDROCHLORIDE 12.5; 1 MG/1; MG/1
2 TABLET, EXTENDED RELEASE ORAL DAILY
Qty: 60 TABLET | Refills: 5 | Status: SHIPPED | OUTPATIENT
Start: 2025-05-07

## 2025-08-20 ENCOUNTER — LAB (OUTPATIENT)
Dept: FAMILY MEDICINE CLINIC | Facility: CLINIC | Age: 65
End: 2025-08-20

## 2025-08-20 DIAGNOSIS — E11.9 CONTROLLED TYPE 2 DIABETES MELLITUS WITHOUT COMPLICATION, WITHOUT LONG-TERM CURRENT USE OF INSULIN (HCC): ICD-10-CM

## 2025-08-20 DIAGNOSIS — I10 ESSENTIAL HYPERTENSION, BENIGN: ICD-10-CM

## 2025-08-20 DIAGNOSIS — E78.2 MIXED HYPERLIPIDEMIA: ICD-10-CM

## 2025-08-20 LAB
ALBUMIN SERPL-MCNC: 4.3 G/DL (ref 3.2–4.6)
ALBUMIN/GLOB SERPL: 1.3 (ref 1–1.9)
ALP SERPL-CCNC: 71 U/L (ref 35–104)
ALT SERPL-CCNC: 30 U/L (ref 8–45)
ANION GAP SERPL CALC-SCNC: 13 MMOL/L (ref 7–16)
AST SERPL-CCNC: 47 U/L (ref 15–37)
BASOPHILS # BLD: 0.05 K/UL (ref 0–0.2)
BASOPHILS NFR BLD: 0.6 % (ref 0–2)
BILIRUB SERPL-MCNC: 0.7 MG/DL (ref 0–1.2)
BUN SERPL-MCNC: 19 MG/DL (ref 8–23)
CALCIUM SERPL-MCNC: 10.1 MG/DL (ref 8.8–10.2)
CHLORIDE SERPL-SCNC: 104 MMOL/L (ref 98–107)
CHOLEST SERPL-MCNC: 118 MG/DL (ref 0–200)
CO2 SERPL-SCNC: 21 MMOL/L (ref 20–29)
CREAT SERPL-MCNC: 1.16 MG/DL (ref 0.6–1.1)
DIFFERENTIAL METHOD BLD: ABNORMAL
EOSINOPHIL # BLD: 0.2 K/UL (ref 0–0.8)
EOSINOPHIL NFR BLD: 2.5 % (ref 0.5–7.8)
ERYTHROCYTE [DISTWIDTH] IN BLOOD BY AUTOMATED COUNT: 12.7 % (ref 11.9–14.6)
EST. AVERAGE GLUCOSE BLD GHB EST-MCNC: 120 MG/DL
GLOBULIN SER CALC-MCNC: 3.3 G/DL (ref 2.3–3.5)
GLUCOSE SERPL-MCNC: 104 MG/DL (ref 70–99)
HBA1C MFR BLD: 5.8 % (ref 0–5.6)
HCT VFR BLD AUTO: 49.4 % (ref 35.8–46.3)
HDLC SERPL-MCNC: 34 MG/DL (ref 40–60)
HDLC SERPL: 3.5 (ref 0–5)
HGB BLD-MCNC: 16.8 G/DL (ref 11.7–15.4)
IMM GRANULOCYTES # BLD AUTO: 0.04 K/UL (ref 0–0.5)
IMM GRANULOCYTES NFR BLD AUTO: 0.5 % (ref 0–5)
LDLC SERPL CALC-MCNC: 59 MG/DL (ref 0–100)
LYMPHOCYTES # BLD: 2.26 K/UL (ref 0.5–4.6)
LYMPHOCYTES NFR BLD: 27.9 % (ref 13–44)
MCH RBC QN AUTO: 31.2 PG (ref 26.1–32.9)
MCHC RBC AUTO-ENTMCNC: 34 G/DL (ref 31.4–35)
MCV RBC AUTO: 91.8 FL (ref 82–102)
MONOCYTES # BLD: 0.71 K/UL (ref 0.1–1.3)
MONOCYTES NFR BLD: 8.8 % (ref 4–12)
NEUTS SEG # BLD: 4.84 K/UL (ref 1.7–8.2)
NEUTS SEG NFR BLD: 59.7 % (ref 43–78)
NRBC # BLD: 0 K/UL (ref 0–0.2)
PLATELET # BLD AUTO: 276 K/UL (ref 150–450)
PMV BLD AUTO: 10.3 FL (ref 9.4–12.3)
POTASSIUM SERPL-SCNC: ABNORMAL MMOL/L (ref 3.5–5.1)
PROT SERPL-MCNC: 7.6 G/DL (ref 6.3–8.2)
RBC # BLD AUTO: 5.38 M/UL (ref 4.05–5.2)
SODIUM SERPL-SCNC: 138 MMOL/L (ref 136–145)
TRIGL SERPL-MCNC: 128 MG/DL (ref 0–150)
TSH W FREE THYROID IF ABNORMAL: 1.93 UIU/ML (ref 0.27–4.2)
VLDLC SERPL CALC-MCNC: 26 MG/DL (ref 6–23)
WBC # BLD AUTO: 8.1 K/UL (ref 4.3–11.1)

## 2025-08-21 ENCOUNTER — RESULTS FOLLOW-UP (OUTPATIENT)
Dept: FAMILY MEDICINE CLINIC | Facility: CLINIC | Age: 65
End: 2025-08-21

## 2025-08-27 ENCOUNTER — TELEMEDICINE (OUTPATIENT)
Dept: FAMILY MEDICINE CLINIC | Facility: CLINIC | Age: 65
End: 2025-08-27
Payer: COMMERCIAL

## 2025-08-27 DIAGNOSIS — J30.89 NON-SEASONAL ALLERGIC RHINITIS, UNSPECIFIED TRIGGER: ICD-10-CM

## 2025-08-27 DIAGNOSIS — E78.2 MIXED HYPERLIPIDEMIA: ICD-10-CM

## 2025-08-27 DIAGNOSIS — I10 ESSENTIAL HYPERTENSION, BENIGN: ICD-10-CM

## 2025-08-27 DIAGNOSIS — G47.33 OSA (OBSTRUCTIVE SLEEP APNEA): ICD-10-CM

## 2025-08-27 DIAGNOSIS — E11.9 CONTROLLED TYPE 2 DIABETES MELLITUS WITHOUT COMPLICATION, WITHOUT LONG-TERM CURRENT USE OF INSULIN (HCC): Primary | ICD-10-CM

## 2025-08-27 PROCEDURE — 99214 OFFICE O/P EST MOD 30 MIN: CPT

## 2025-08-27 RX ORDER — SIMVASTATIN 20 MG
20 TABLET ORAL NIGHTLY
Qty: 90 TABLET | Refills: 1 | Status: SHIPPED | OUTPATIENT
Start: 2025-08-27

## 2025-08-27 RX ORDER — TIRZEPATIDE 5 MG/.5ML
0.5 INJECTION, SOLUTION SUBCUTANEOUS WEEKLY
Qty: 6 ML | Refills: 1 | Status: SHIPPED | OUTPATIENT
Start: 2025-08-27

## 2025-08-27 ASSESSMENT — ENCOUNTER SYMPTOMS
WHEEZING: 0
SHORTNESS OF BREATH: 0
DIARRHEA: 0
NAUSEA: 0
CHEST TIGHTNESS: 0
COUGH: 0
CONSTIPATION: 0
BLOOD IN STOOL: 0